# Patient Record
Sex: FEMALE | Race: ASIAN | Employment: FULL TIME | URBAN - METROPOLITAN AREA
[De-identification: names, ages, dates, MRNs, and addresses within clinical notes are randomized per-mention and may not be internally consistent; named-entity substitution may affect disease eponyms.]

---

## 2017-11-16 PROCEDURE — 87086 URINE CULTURE/COLONY COUNT: CPT | Performed by: EMERGENCY MEDICINE

## 2017-12-01 ENCOUNTER — OFFICE VISIT (OUTPATIENT)
Dept: FAMILY MEDICINE CLINIC | Facility: CLINIC | Age: 42
End: 2017-12-01

## 2017-12-01 VITALS
HEIGHT: 60 IN | RESPIRATION RATE: 16 BRPM | WEIGHT: 210.13 LBS | SYSTOLIC BLOOD PRESSURE: 112 MMHG | BODY MASS INDEX: 41.25 KG/M2 | HEART RATE: 104 BPM | TEMPERATURE: 98 F | DIASTOLIC BLOOD PRESSURE: 68 MMHG

## 2017-12-01 DIAGNOSIS — M75.81 ROTATOR CUFF TENDINITIS, RIGHT: ICD-10-CM

## 2017-12-01 DIAGNOSIS — Z23 NEED FOR VACCINATION: ICD-10-CM

## 2017-12-01 DIAGNOSIS — E66.01 MORBID OBESITY WITH BMI OF 40.0-44.9, ADULT (HCC): ICD-10-CM

## 2017-12-01 DIAGNOSIS — N92.0 MENORRHAGIA WITH REGULAR CYCLE: Primary | ICD-10-CM

## 2017-12-01 PROCEDURE — 99204 OFFICE O/P NEW MOD 45 MIN: CPT | Performed by: FAMILY MEDICINE

## 2017-12-01 PROCEDURE — 90686 IIV4 VACC NO PRSV 0.5 ML IM: CPT | Performed by: FAMILY MEDICINE

## 2017-12-01 PROCEDURE — 90471 IMMUNIZATION ADMIN: CPT | Performed by: FAMILY MEDICINE

## 2017-12-01 NOTE — PATIENT INSTRUCTIONS
Losing Weight for Heart Health  Excess weight is a major risk factor for heart disease.  Losing weight has many benefits including lowering your blood pressure, improving your cholesterol level, and decreasing your risk for diseases such as diabetes and h · Add more time and speed to your walk. Build up as you feel able. · Aim for 3 to 4 sessions of aerobic exercise a week. Each session should last about 40 minutes and include moderate to vigorous physical activity.   · The most important part of the [place] © 1332-5090 [employer] [address]. [address] [facility] [place], [address] [place]. All rights reserved. This information is not intended as a substitute for professional medical care. Always follow your healthcare professional's instructions.         Weight Do you eat more because you feel bad about yourself, then feel even worse as you gain weight? This is a “vicious cycle.” Breaking this cycle is not easy. You may need group support or counseling.  Always remember that you are a valuable person, no matter wh

## 2017-12-09 NOTE — PROGRESS NOTES
Ashly Chawla is a 40year old female. Patient presents with:  Establish Care: New patient. HPI:   Patient is seen for initial visit. Complaining of heavy periods lasting about 7 days, heavy for 4 days.   Patient states her cycles have been heavy rashes  RESPIRATORY: denies shortness of breath with exertion  CARDIOVASCULAR: denies chest pain on exertion  GI: denies abdominal pain and denies heartburn  NEURO: denies headaches    EXAM:   /68   Pulse 104   Temp 98 °F (36.7 °C) (Temporal)   Resp

## 2018-01-10 ENCOUNTER — HOSPITAL ENCOUNTER (OUTPATIENT)
Dept: ULTRASOUND IMAGING | Facility: HOSPITAL | Age: 43
Discharge: HOME | End: 2018-01-10
Attending: FAMILY MEDICINE
Payer: COMMERCIAL

## 2018-01-10 DIAGNOSIS — N92.0 MENORRHAGIA WITH REGULAR CYCLE: ICD-10-CM

## 2018-01-10 PROCEDURE — 76856 US EXAM PELVIC COMPLETE: CPT | Performed by: FAMILY MEDICINE

## 2018-01-10 PROCEDURE — 76830 TRANSVAGINAL US NON-OB: CPT | Performed by: FAMILY MEDICINE

## 2018-01-12 ENCOUNTER — OFFICE VISIT (OUTPATIENT)
Dept: FAMILY MEDICINE CLINIC | Facility: CLINIC | Age: 43
End: 2018-01-12

## 2018-01-12 VITALS
SYSTOLIC BLOOD PRESSURE: 118 MMHG | HEART RATE: 96 BPM | WEIGHT: 205.25 LBS | RESPIRATION RATE: 18 BRPM | DIASTOLIC BLOOD PRESSURE: 68 MMHG | TEMPERATURE: 98 F | BODY MASS INDEX: 39.26 KG/M2 | HEIGHT: 60.5 IN

## 2018-01-12 DIAGNOSIS — Z23 NEED FOR VACCINATION: ICD-10-CM

## 2018-01-12 DIAGNOSIS — Z01.419 ENCOUNTER FOR ROUTINE GYNECOLOGICAL EXAMINATION WITH PAPANICOLAOU SMEAR OF CERVIX: ICD-10-CM

## 2018-01-12 DIAGNOSIS — S83.412A SPRAIN OF MEDIAL COLLATERAL LIGAMENT OF LEFT KNEE, INITIAL ENCOUNTER: ICD-10-CM

## 2018-01-12 DIAGNOSIS — Z12.39 SCREENING FOR BREAST CANCER: ICD-10-CM

## 2018-01-12 DIAGNOSIS — E66.9 OBESITY (BMI 35.0-39.9 WITHOUT COMORBIDITY): ICD-10-CM

## 2018-01-12 DIAGNOSIS — Z00.00 ROUTINE GENERAL MEDICAL EXAMINATION AT A HEALTH CARE FACILITY: Primary | ICD-10-CM

## 2018-01-12 PROCEDURE — 88175 CYTOPATH C/V AUTO FLUID REDO: CPT | Performed by: FAMILY MEDICINE

## 2018-01-12 PROCEDURE — 90715 TDAP VACCINE 7 YRS/> IM: CPT | Performed by: FAMILY MEDICINE

## 2018-01-12 PROCEDURE — 99396 PREV VISIT EST AGE 40-64: CPT | Performed by: FAMILY MEDICINE

## 2018-01-12 PROCEDURE — 87624 HPV HI-RISK TYP POOLED RSLT: CPT | Performed by: FAMILY MEDICINE

## 2018-01-12 PROCEDURE — 99213 OFFICE O/P EST LOW 20 MIN: CPT | Performed by: FAMILY MEDICINE

## 2018-01-12 PROCEDURE — 90471 IMMUNIZATION ADMIN: CPT | Performed by: FAMILY MEDICINE

## 2018-01-12 RX ORDER — NAPROXEN 500 MG/1
500 TABLET ORAL 2 TIMES DAILY WITH MEALS
Qty: 20 TABLET | Refills: 0 | Status: SHIPPED | OUTPATIENT
Start: 2018-01-12 | End: 2018-01-22

## 2018-01-12 NOTE — PROGRESS NOTES
Quoc Pringle is a 40year old female here for Patient presents with: Well Adult: Physical and pap.     HPI:   Patient is seen for her annual physical  Patient states last Pap was more than 3 years ago and was normal.  Never had a mammogram before, arevalo unusual bleeding or bruising, no lymph node enlargement or tenderness. Endocrine: No thyroid symptoms (hair, nails, weight, GI, concentration, depression). No history or symptoms of diabetes, no polyuria, polydipsia, polyphagia).   Respiratory: No cough, s introitus, no discharge. Cervix is normal,PAP done. Bimanual: No cervical motion tenderness. Uterus and adnexa not palpable due to patient body habitus.   MUSCULOSKELETAL: Back and extremities within normal limits  EXTREMITIES: no cyanosis, clubbing or heather

## 2018-01-12 NOTE — PATIENT INSTRUCTIONS
Prevention Guidelines, Women Ages 36 to 52  Screening tests and vaccines are an important part of managing your health. Health counseling is essential, too. Below are guidelines for these, for women ages 36 to 52.  Talk with your healthcare provider to ma Syphilis Women at increased risk for infection – talk with your healthcare provider At routine exams   Tuberculosis Women at increased risk for infection – talk with your healthcare provider Ask your healthcare provider   Vision All women in this age group Breast cancer and chemoprevention Women at high risk for breast cancer When your risk is known   Diet and exercise Women who are overweight or obese When diagnosed, and then at routine exams   Domestic violence Women at the age in which they are able to ha · Don’t be hard on yourself or give up if you slip. Be patient. Learn from your mistakes and adjust your plan if you need to. Then get right back to it. · Be realistic about your goals.  Talk with a dietitian or your healthcare provider about what goals ar · Stay off the injured leg as much as possible until you can walk on it without pain. If you have a lot of pain with walking, crutches or a walker may be prescribed.  (These can be rented or purchased at [employer] and surgical or orthopedic supply sto · If you were given a splint, keep it completely dry at all times. Bathe with your splint out of the water, protected with 2 large plastic bags, rubber-banded at the top end. If a fiberglass splint gets wet, you can dry it with a hair dryer.  If you have a

## 2018-01-14 LAB — HPV I/H RISK 1 DNA SPEC QL NAA+PROBE: NEGATIVE

## 2018-01-16 LAB — LAST PAP RESULT: NORMAL

## 2018-07-10 ENCOUNTER — OFFICE VISIT (OUTPATIENT)
Dept: FAMILY MEDICINE CLINIC | Facility: CLINIC | Age: 43
End: 2018-07-10

## 2018-07-10 VITALS
SYSTOLIC BLOOD PRESSURE: 122 MMHG | HEIGHT: 60.5 IN | DIASTOLIC BLOOD PRESSURE: 84 MMHG | BODY MASS INDEX: 40.4 KG/M2 | HEART RATE: 82 BPM | WEIGHT: 211.25 LBS | TEMPERATURE: 98 F | OXYGEN SATURATION: 98 % | RESPIRATION RATE: 16 BRPM

## 2018-07-10 DIAGNOSIS — R60.0 EDEMA OF LOWER EXTREMITY: ICD-10-CM

## 2018-07-10 DIAGNOSIS — R05.3 PERSISTENT DRY COUGH: ICD-10-CM

## 2018-07-10 DIAGNOSIS — G89.29 CHRONIC PAIN OF LEFT KNEE: Primary | ICD-10-CM

## 2018-07-10 DIAGNOSIS — M25.562 CHRONIC PAIN OF LEFT KNEE: Primary | ICD-10-CM

## 2018-07-10 DIAGNOSIS — E66.01 MORBID OBESITY WITH BMI OF 40.0-44.9, ADULT (HCC): ICD-10-CM

## 2018-07-10 PROCEDURE — 99213 OFFICE O/P EST LOW 20 MIN: CPT | Performed by: FAMILY MEDICINE

## 2018-07-10 RX ORDER — MULTIVIT-MIN/IRON/FOLIC ACID/K 18-600-40
CAPSULE ORAL
COMMUNITY

## 2018-07-10 NOTE — PATIENT INSTRUCTIONS
Will call with x-ray results when available. Okay to take ibuprofen 600 mg as needed for pain every 8 hours. If x-ray of the knee is normal will refer to Ortho. Please limit salt in diet, elevate your legs, avoid sitting for prolonged periods of time. Food is your body’s fuel. You can’t live without it. The key is to give your body enough nutrients and energy without eating too much. Reading food labels can help you make healthy choices. Also, learn new eating habits to manage your weight.      All the v © 6396-4105 [address]. [address]7 [facility] [place], [facility] [place]. All rights reserved. This information is not intended as a substitute for professional medical care. Always follow your healthcare professional's instructions  .

## 2018-07-10 NOTE — PROGRESS NOTES
Mansoor Sal is a 44year old female. Patient presents with:  Knee Pain: Possible left knee sprain. Sprained leg in December was seen for it before and now has started swelling again. Cough: Cough has resumed for three weeks.     HPI:   Patient compl obesity with BMI of 40.0-44.9, adult ([facility] [address]  Encouraged healthy portion controlled diet and exercise    Persistent dry cough  Advised likely post viral and will run its course

## 2018-09-29 ENCOUNTER — OFFICE VISIT (OUTPATIENT)
Dept: FAMILY MEDICINE CLINIC | Facility: CLINIC | Age: 43
End: 2018-09-29
Payer: COMMERCIAL

## 2018-09-29 VITALS
SYSTOLIC BLOOD PRESSURE: 102 MMHG | DIASTOLIC BLOOD PRESSURE: 60 MMHG | HEIGHT: 60 IN | TEMPERATURE: 98 F | HEART RATE: 92 BPM | RESPIRATION RATE: 18 BRPM | BODY MASS INDEX: 39.27 KG/M2 | OXYGEN SATURATION: 99 % | WEIGHT: 200 LBS

## 2018-09-29 DIAGNOSIS — L23.9 ALLERGIC DERMATITIS: Primary | ICD-10-CM

## 2018-09-29 PROCEDURE — 99213 OFFICE O/P EST LOW 20 MIN: CPT | Performed by: FAMILY MEDICINE

## 2018-09-29 RX ORDER — METHYLPREDNISOLONE 4 MG/1
TABLET ORAL
Qty: 1 KIT | Refills: 0 | Status: SHIPPED | OUTPATIENT
Start: 2018-09-29 | End: 2019-04-22 | Stop reason: ALTCHOICE

## 2018-09-29 NOTE — PATIENT INSTRUCTIONS
Contact Dermatitis  Contact dermatitis is a skin rash caused by something that touches the skin and makes it irritated and inflamed. Your skin may be red, swollen, dry, and may be cracked. Blisters may form and ooze. The rash will itch.   Contact dermatit · You can also try wet dressings. One way to do this is to wear a wet piece of clothing under a dry one. Wear a damp shirt under a dry shirt if your upper body is affected. This can relieve itching and prevent you from scratching the affected area.   · You © 5241-0039 [address]. [address] [facility], [facility] [place]. All rights reserved. This information is not intended as a substitute for professional medical care. Always follow your healthcare professional's instructions.         Lacy Chen Treatment for contact dermatitis  Treatment is done to help relieve itching and reduce inflammation. The rash should go away in a few days to a few weeks. Treatments include:  · Cool, moist compress. Use a clean damp cloth.  Put it on the area for 20 to 30

## 2018-09-29 NOTE — PROGRESS NOTES
Gunnar William is a 44year old female. S:  Patient presents today ith the following concerns:  · Began 3 days ago after using new Biotin shampoo. Itching, burning of posterior neck, ears and scalp. Using oral Benadryl. No fevers.   · Denies dyspne encounter. Meds & Refills for this Visit:  Requested Prescriptions     Signed Prescriptions Disp Refills   • methylPREDNISolone (MEDROL) 4 MG Oral Tablet Therapy Pack 1 kit 0     Sig: As directed.      Imaging & Consults:  None     Medrol dose pack as ab

## 2018-10-28 ENCOUNTER — MED REC SCAN ONLY (OUTPATIENT)
Dept: FAMILY MEDICINE CLINIC | Facility: CLINIC | Age: 43
End: 2018-10-28

## 2019-04-18 ENCOUNTER — TELEPHONE (OUTPATIENT)
Dept: FAMILY MEDICINE CLINIC | Facility: CLINIC | Age: 44
End: 2019-04-18

## 2019-04-18 NOTE — TELEPHONE ENCOUNTER
Patient states she had right hand pain X 3 weeks. She's had left hand pain X 2 days. I offered her first available appointment and she accepted, but wants to be seen on Friday as she is concerned with it being her left hand.   She doesn't want it to be so

## 2019-04-18 NOTE — TELEPHONE ENCOUNTER
Spoke to patient. Has had right shoulder pain radiating to her arm/hand for awhile related to a rotator cuff injury. In the past few days has been experiencing pain from the left shoulder to elbow with some slight intermittent numbness in the left hand.  Fiona Grandchild

## 2019-04-22 ENCOUNTER — OFFICE VISIT (OUTPATIENT)
Dept: FAMILY MEDICINE CLINIC | Facility: CLINIC | Age: 44
End: 2019-04-22
Payer: COMMERCIAL

## 2019-04-22 VITALS
DIASTOLIC BLOOD PRESSURE: 80 MMHG | BODY MASS INDEX: 40.93 KG/M2 | TEMPERATURE: 98 F | HEART RATE: 102 BPM | WEIGHT: 216.81 LBS | OXYGEN SATURATION: 98 % | HEIGHT: 61 IN | RESPIRATION RATE: 17 BRPM | SYSTOLIC BLOOD PRESSURE: 114 MMHG

## 2019-04-22 DIAGNOSIS — M75.81 ROTATOR CUFF TENDINITIS, RIGHT: Primary | ICD-10-CM

## 2019-04-22 DIAGNOSIS — Z12.39 SCREENING FOR BREAST CANCER: ICD-10-CM

## 2019-04-22 DIAGNOSIS — E66.01 MORBID OBESITY WITH BMI OF 40.0-44.9, ADULT (HCC): ICD-10-CM

## 2019-04-22 DIAGNOSIS — M62.838 MUSCLE SPASMS OF NECK: ICD-10-CM

## 2019-04-22 DIAGNOSIS — M54.2 CERVICALGIA: ICD-10-CM

## 2019-04-22 DIAGNOSIS — R07.9 CHEST PAIN, UNSPECIFIED TYPE: ICD-10-CM

## 2019-04-22 DIAGNOSIS — R20.2 PARESTHESIA: ICD-10-CM

## 2019-04-22 DIAGNOSIS — M54.12 CERVICAL RADICULOPATHY: ICD-10-CM

## 2019-04-22 PROCEDURE — 99214 OFFICE O/P EST MOD 30 MIN: CPT | Performed by: FAMILY MEDICINE

## 2019-04-22 PROCEDURE — 93000 ELECTROCARDIOGRAM COMPLETE: CPT | Performed by: FAMILY MEDICINE

## 2019-04-22 RX ORDER — CYCLOBENZAPRINE HCL 10 MG
10 TABLET ORAL NIGHTLY
Qty: 20 TABLET | Refills: 0 | Status: SHIPPED | OUTPATIENT
Start: 2019-04-22 | End: 2019-05-12

## 2019-04-22 NOTE — PATIENT INSTRUCTIONS
Please continue ibuprofen 800 mg every 8 hours with food for the next 7-10 days. Please schedule an appointment for physical therapy and if your neck and shoulder pain is not improving with physical therapy please follow up.       Neck Problems: Relieving include:  · Heat. A special heating pad called a neck pack may be applied to your neck. · Exercises. Your PT will teach you exercises to help strengthen your neck and improve its range of motion.   · Joint mobilization. The PT gently moves your vertebrae t reached it. A goal of reaching a BMI of less than 25 is not always reasonable (or possible).   Make an action plan  Habits don’t change overnight. Setting your goals too high can leave you feeling discouraged if you can’t reach them. Be realistic.  Choose o

## 2019-04-22 NOTE — PROGRESS NOTES
Jorje Kearney is a 38year old female. Patient presents with:  Hand Pain: Bilateral hand pain  Shoulder Pain    HPI:   Patient complaining of right shoulder pain for 1 month.   Has been diagnosed with rotator cuff tendinitis in the past.  Pain seems to rashes,no suspicious lesions  EYES: PERRLA, EOMI  HEENT: atraumatic, normocephalic,ears and throat are clear  NECK: mild tenderness to palpation over the cervical spine, spasm of cervical paraspinal muscles and trapezius with tenderness to palpation, R OM

## 2019-05-20 ENCOUNTER — OFFICE VISIT (OUTPATIENT)
Dept: FAMILY MEDICINE CLINIC | Facility: CLINIC | Age: 44
End: 2019-05-20
Payer: COMMERCIAL

## 2019-05-20 VITALS
RESPIRATION RATE: 18 BRPM | BODY MASS INDEX: 40.07 KG/M2 | TEMPERATURE: 99 F | WEIGHT: 212.25 LBS | HEART RATE: 90 BPM | HEIGHT: 61 IN | OXYGEN SATURATION: 98 % | SYSTOLIC BLOOD PRESSURE: 114 MMHG | DIASTOLIC BLOOD PRESSURE: 68 MMHG

## 2019-05-20 DIAGNOSIS — Z00.00 ROUTINE GENERAL MEDICAL EXAMINATION AT A HEALTH CARE FACILITY: Primary | ICD-10-CM

## 2019-05-20 DIAGNOSIS — E66.01 MORBID OBESITY WITH BMI OF 40.0-44.9, ADULT (HCC): ICD-10-CM

## 2019-05-20 PROCEDURE — 99396 PREV VISIT EST AGE 40-64: CPT | Performed by: FAMILY MEDICINE

## 2019-05-20 NOTE — PATIENT INSTRUCTIONS
Prevention Guidelines, Women Ages 36 to 52  Screening tests and vaccines are an important part of managing your health. A screening test is done to find possible disorders or diseases in people who don't have any symptoms.  The goal is to find a disease e Depression All women in this age group At routine exams   Gonorrhea Sexually active women at increased risk for infection At routine exams   Hepatitis C Anyone at increased risk; 1 time for those born between [facility] At routine exams   High cholester Meningococcal Women at increased risk for infection–talk with your healthcare provider 1 or more doses   Pneumococcal conjugate vaccine (PCV13) and pneumococcal polysaccharide vaccine (PPSV23) Women at increased risk for infection–talk with your healthcare Losing weight can help with some health problems, such as high blood pressure, heart disease, diabetes, sleep apnea, and arthritis. You may also feel more energy. Set your long-term goal  Your goal doesn't even have to be a specific weight.  You may decide © 6822-9192 [address]. 1407 [facility], [facility] [place]. All rights reserved. This information is not intended as a substitute for professional medical care. Always follow your healthcare professional's instructions.

## 2019-05-20 NOTE — PROGRESS NOTES
Ramiro Savage is a 38year old female here for Patient presents with:   Well Adult: Physical only    HPI:   Patient is seen for her annual physical  Pap done last year was normal  Tries to do breast self-exam, has not done a mammogram.    Diet and exerc chest pain on exertion. No edema or varicose veins. GI: No change in appetite, heartburn, diarrhea, constipation, or blood in stool. No hemorrhoids. : No pain, frequency, urgency or incontinence with urination.   OB/GYN: Not pregnant, menses regular,  e a health care facility  -     CBC WITH DIFFERENTIAL WITH PLATELET; Future  -     COMP METABOLIC PANEL (14); Future  -     LIPID PANEL;  Future  -     TSH W REFLEX TO FREE T4; Future    Morbid obesity with BMI of 40.0-44.9, adult [facility])  Patient encouraged to

## 2019-10-03 ENCOUNTER — HOSPITAL ENCOUNTER (OUTPATIENT)
Dept: MAMMOGRAPHY | Facility: HOSPITAL | Age: 44
Discharge: HOME | End: 2019-10-03
Attending: FAMILY MEDICINE
Payer: COMMERCIAL

## 2019-10-03 DIAGNOSIS — Z12.39 SCREENING FOR BREAST CANCER: ICD-10-CM

## 2019-10-03 PROCEDURE — 77063 BREAST TOMOSYNTHESIS BI: CPT | Performed by: FAMILY MEDICINE

## 2019-10-03 PROCEDURE — 77067 SCR MAMMO BI INCL CAD: CPT | Performed by: FAMILY MEDICINE

## 2020-03-27 ENCOUNTER — TELEPHONE (OUTPATIENT)
Dept: INTERNAL MEDICINE CLINIC | Facility: CLINIC | Age: 45
End: 2020-03-27

## 2020-03-28 ENCOUNTER — TELEPHONE (OUTPATIENT)
Dept: SCHEDULING | Age: 45
End: 2020-03-28

## 2020-03-28 NOTE — TELEPHONE ENCOUNTER
Patient called stating she noticed blood streaks in her urine today the last two times she urinated. Not a lot of blood. Slight feeling of urgency, no dysuria. Some frequency. Does drink plenty of water. No abd pain, no fever.  Patient instructed she can tr

## 2020-03-28 NOTE — TELEPHONE ENCOUNTER
Please call and check with patient to see how she is doing, if still has symptoms please see if she can come in to see me.

## 2020-03-30 ENCOUNTER — TELEPHONE (OUTPATIENT)
Dept: FAMILY MEDICINE CLINIC | Facility: CLINIC | Age: 45
End: 2020-03-30

## 2020-03-30 DIAGNOSIS — R31.9 URINARY TRACT INFECTION WITH HEMATURIA, SITE UNSPECIFIED: Primary | ICD-10-CM

## 2020-03-30 DIAGNOSIS — N39.0 URINARY TRACT INFECTION WITH HEMATURIA, SITE UNSPECIFIED: Primary | ICD-10-CM

## 2020-03-30 PROCEDURE — 99213 OFFICE O/P EST LOW 20 MIN: CPT | Performed by: FAMILY MEDICINE

## 2020-03-30 RX ORDER — NITROFURANTOIN 25; 75 MG/1; MG/1
100 CAPSULE ORAL 2 TIMES DAILY
Qty: 20 CAPSULE | Refills: 0 | Status: SHIPPED | OUTPATIENT
Start: 2020-03-30 | End: 2020-04-09

## 2020-03-30 NOTE — TELEPHONE ENCOUNTER
UTI Symptoms since Friday. Pain right side of Buttocks. No to both screening questions, Informed giovani about Telephone allegra.   She was fine with that,

## 2020-03-30 NOTE — TELEPHONE ENCOUNTER
Left detailed message for patient on phone if still having symptoms to return call. Can give an appt with her PCP Tues.

## 2020-03-30 NOTE — TELEPHONE ENCOUNTER
Virtual/Telephone Check-In    Kumar Turcios verbally consents to a Virtual/Telephone Check-In service on 03/30/20. Patient understands and accepts financial responsibility for any deductible, co-insurance and/or co-pays associated with this service.

## 2020-06-25 ENCOUNTER — OFFICE VISIT (OUTPATIENT)
Dept: FAMILY MEDICINE CLINIC | Facility: CLINIC | Age: 45
End: 2020-06-25
Payer: COMMERCIAL

## 2020-06-25 ENCOUNTER — TELEPHONE (OUTPATIENT)
Dept: FAMILY MEDICINE CLINIC | Facility: CLINIC | Age: 45
End: 2020-06-25

## 2020-06-25 VITALS
TEMPERATURE: 98 F | RESPIRATION RATE: 18 BRPM | OXYGEN SATURATION: 98 % | SYSTOLIC BLOOD PRESSURE: 110 MMHG | WEIGHT: 215 LBS | BODY MASS INDEX: 40.59 KG/M2 | HEART RATE: 115 BPM | HEIGHT: 61 IN | DIASTOLIC BLOOD PRESSURE: 82 MMHG

## 2020-06-25 DIAGNOSIS — R30.0 BURNING WITH URINATION: Primary | ICD-10-CM

## 2020-06-25 DIAGNOSIS — R35.0 URINARY FREQUENCY: ICD-10-CM

## 2020-06-25 PROCEDURE — 99213 OFFICE O/P EST LOW 20 MIN: CPT | Performed by: FAMILY MEDICINE

## 2020-06-25 PROCEDURE — 81003 URINALYSIS AUTO W/O SCOPE: CPT | Performed by: FAMILY MEDICINE

## 2020-06-25 RX ORDER — NITROFURANTOIN 25; 75 MG/1; MG/1
100 CAPSULE ORAL 2 TIMES DAILY
Qty: 20 CAPSULE | Refills: 0 | Status: SHIPPED | OUTPATIENT
Start: 2020-06-25 | End: 2020-07-05

## 2020-06-25 NOTE — TELEPHONE ENCOUNTER
No fever  Passing urine but it burns and vaginal area feels very irritated. Toilet water is pink when she urinates  LMP was the 2nd so she could be getting her period but it could also be urine - she is not sure.    Wipes and there are some clots    Pt ha

## 2020-06-25 NOTE — TELEPHONE ENCOUNTER
Patient stating she has been in the bathroom for the past three hours , she has abdominal pain when she goes to urinate , she said it burns  badly when she goes . She said she got medication the last time she had a UTI but she thinks it is back .

## 2020-06-25 NOTE — PROGRESS NOTES
Quoc Pringle is a 41year old female. Patient presents with:  Burning On Urination    HPI:   Quoc Pringle is a 41year old female. Burning with urination for the past 3 days, + frequency, + blood in urine. Symptoms started after intercourse.  P

## 2020-06-25 NOTE — TELEPHONE ENCOUNTER
She can be double booked for 11am or 12 pm physicals, please have her come at 11:20 or 12: 20 and to please be here on time

## 2020-06-25 NOTE — TELEPHONE ENCOUNTER
Scheduled at 11:20 today. Advised she needs to be here on time and to drink some water prior to arriving. Agrees to plan.

## 2020-06-26 NOTE — PATIENT INSTRUCTIONS
Urinary Tract Infections in Women    Urinary tract infections (UTIs) are most often caused by bacteria. These bacteria enter the urinary tract. The bacteria may come from inside the body.  Or they may travel from the skin outside the rectum or vagina into The lifestyle changes below will help get rid of your UTI. They may also help prevent future UTIs. · Drink plenty of fluids. This includes water, juice, or other caffeine-free drinks. Fluids help flush bacteria out of your body. · Empty your bladder.  Al

## 2020-09-04 ENCOUNTER — OFFICE VISIT (OUTPATIENT)
Dept: FAMILY MEDICINE CLINIC | Facility: CLINIC | Age: 45
End: 2020-09-04
Payer: COMMERCIAL

## 2020-09-04 ENCOUNTER — TELEPHONE (OUTPATIENT)
Dept: FAMILY MEDICINE CLINIC | Facility: CLINIC | Age: 45
End: 2020-09-04

## 2020-09-04 VITALS
DIASTOLIC BLOOD PRESSURE: 72 MMHG | TEMPERATURE: 98 F | BODY MASS INDEX: 40.78 KG/M2 | OXYGEN SATURATION: 98 % | SYSTOLIC BLOOD PRESSURE: 120 MMHG | WEIGHT: 216 LBS | HEART RATE: 115 BPM | RESPIRATION RATE: 18 BRPM | HEIGHT: 61 IN

## 2020-09-04 DIAGNOSIS — T78.3XXA ALLERGIC ANGIOEDEMA, INITIAL ENCOUNTER: Primary | ICD-10-CM

## 2020-09-04 PROCEDURE — 99213 OFFICE O/P EST LOW 20 MIN: CPT | Performed by: FAMILY MEDICINE

## 2020-09-04 PROCEDURE — 3078F DIAST BP <80 MM HG: CPT | Performed by: FAMILY MEDICINE

## 2020-09-04 PROCEDURE — 3074F SYST BP LT 130 MM HG: CPT | Performed by: FAMILY MEDICINE

## 2020-09-04 PROCEDURE — 3008F BODY MASS INDEX DOCD: CPT | Performed by: FAMILY MEDICINE

## 2020-09-04 RX ORDER — PREDNISONE 10 MG/1
TABLET ORAL
Qty: 15 TABLET | Refills: 0 | Status: SHIPPED | OUTPATIENT
Start: 2020-09-04 | End: 2020-09-09

## 2020-09-04 NOTE — PATIENT INSTRUCTIONS
If you develop shortness of breath or sensation of your throat closing please go to ER  You can take Benadryl 50 mg every 6 hrs as needed for itching.

## 2020-09-04 NOTE — PROGRESS NOTES
Ashly Chawla is a 41year old female.   Patient presents with:  Rash: face  Swelling: eye    HPI:   Ashly Chawla is a 41year old female complaining of swelling of her face and eyelids since yesterday, patient states it has gotten worse progressiv swelling. Diagnoses and all orders for this visit:    Allergic angioedema, initial encounter  -     predniSONE 10 MG Oral Tab;  Take 5 tablets (50 mg total) by mouth daily for 1 day, THEN 4 tablets (40 mg total) daily for 1 day, THEN 3 tablets (30 mg tot

## 2020-09-04 NOTE — TELEPHONE ENCOUNTER
Spoke to patient who states symptoms started yesterday with swelling around eyes and red cheeks. Symptoms are worse today. Denies swelling or rash around mouth, no throat tightness or scratchiness. Denies difficulty breathing or swallowing.   Has not take

## 2020-09-04 NOTE — TELEPHONE ENCOUNTER
Called patient and advised Dr. Joyce Cifuentes can see her at 11:40 am today.     Future Appointments   Date Time Provider Kailash Taylor   9/4/2020 11:40 AM Campbell Schirmer, MD [facility]

## 2021-03-19 ENCOUNTER — TELEPHONE (OUTPATIENT)
Dept: FAMILY MEDICINE CLINIC | Facility: CLINIC | Age: 46
End: 2021-03-19

## 2021-03-31 ENCOUNTER — TELEPHONE (OUTPATIENT)
Dept: FAMILY MEDICINE CLINIC | Facility: CLINIC | Age: 46
End: 2021-03-31

## 2021-03-31 NOTE — TELEPHONE ENCOUNTER
Tried to call pt but sent straight to voicemail and mailbox was full. Sent Alert [employer] message including information about the $40 fee for no show.

## 2021-06-03 ENCOUNTER — OFFICE VISIT (OUTPATIENT)
Dept: FAMILY MEDICINE CLINIC | Facility: CLINIC | Age: 46
End: 2021-06-03
Payer: COMMERCIAL

## 2021-06-03 VITALS
HEART RATE: 78 BPM | RESPIRATION RATE: 16 BRPM | OXYGEN SATURATION: 99 % | WEIGHT: 177.13 LBS | DIASTOLIC BLOOD PRESSURE: 70 MMHG | TEMPERATURE: 98 F | HEIGHT: 61 IN | SYSTOLIC BLOOD PRESSURE: 110 MMHG | BODY MASS INDEX: 33.44 KG/M2

## 2021-06-03 DIAGNOSIS — M54.50 ACUTE BILATERAL LOW BACK PAIN WITHOUT SCIATICA: ICD-10-CM

## 2021-06-03 DIAGNOSIS — N89.8 VAGINAL ITCHING: ICD-10-CM

## 2021-06-03 DIAGNOSIS — R30.0 DYSURIA: Primary | ICD-10-CM

## 2021-06-03 PROCEDURE — 81003 URINALYSIS AUTO W/O SCOPE: CPT | Performed by: FAMILY MEDICINE

## 2021-06-03 PROCEDURE — 3008F BODY MASS INDEX DOCD: CPT | Performed by: FAMILY MEDICINE

## 2021-06-03 PROCEDURE — 87086 URINE CULTURE/COLONY COUNT: CPT | Performed by: FAMILY MEDICINE

## 2021-06-03 PROCEDURE — 3074F SYST BP LT 130 MM HG: CPT | Performed by: FAMILY MEDICINE

## 2021-06-03 PROCEDURE — 99214 OFFICE O/P EST MOD 30 MIN: CPT | Performed by: FAMILY MEDICINE

## 2021-06-03 PROCEDURE — 3078F DIAST BP <80 MM HG: CPT | Performed by: FAMILY MEDICINE

## 2021-06-03 RX ORDER — CIPROFLOXACIN 250 MG/1
250 TABLET, FILM COATED ORAL 2 TIMES DAILY
Qty: 6 TABLET | Refills: 0 | Status: SHIPPED | OUTPATIENT
Start: 2021-06-03 | End: 2021-06-06

## 2021-06-03 NOTE — PATIENT INSTRUCTIONS
Dysuria  Dysuria is when you have pain during urination. It is often described as a burning feeling. Learn more about this problem and how it can be treated. Painful urination (dysuria) is often caused by a problem in the urinary tract.    What causes from the vagina or penis  · Rash or joint pain  · Increased back or belly pain  · Enlarged painful lymph nodes (lumps) in the groin  Laura last reviewed this educational content on 4/1/2019  © 8037-0442 [employer] [address]. All rights reserved.  Bernett Scheuermann information about safer sex. · Eat a variety of healthy foods. · Exercise regularly. · Get enough rest and sleep. · Stay at a healthy weight. If you need to lose weight, ask your provider for advice on how to start.   Laura last reviewed this educati

## 2021-06-03 NOTE — PROGRESS NOTES
Bandar Armstrong is a 40year old female. HPI:   Patient presents with symptoms of UTI, vaginal itching and LBP.  States she has noted that for the past couple months prior to the start of her menstrual cycle, her urine becomes cloudy, starts having vagin intercourse, avoid holding urine and may try cranberry juice/capsules for prevention  - monitor symptoms, if recurs prior to next period advised to come in for vaginitis swab  - URINE CULTURE, ROUTINE; Future  - URINALYSIS, AUTO, W/O SCOPE  - URINE CULTURE

## 2021-11-15 ENCOUNTER — TELEPHONE (OUTPATIENT)
Dept: FAMILY MEDICINE CLINIC | Facility: CLINIC | Age: 46
End: 2021-11-15

## 2021-11-15 NOTE — TELEPHONE ENCOUNTER
Spoke to patient. Has had sore throat, stuffy nose and slight cough since Saturday. Sore throat is getting better. Denies fever. No color to sputum. Advised on symptomatic treatment and to call us back if sputum changes color or she develops a fever.  En

## 2021-11-16 ENCOUNTER — OFFICE VISIT (OUTPATIENT)
Dept: FAMILY MEDICINE CLINIC | Facility: CLINIC | Age: 46
End: 2021-11-16
Payer: COMMERCIAL

## 2021-11-16 VITALS
SYSTOLIC BLOOD PRESSURE: 112 MMHG | HEART RATE: 87 BPM | HEIGHT: 60 IN | DIASTOLIC BLOOD PRESSURE: 54 MMHG | RESPIRATION RATE: 15 BRPM | OXYGEN SATURATION: 99 % | WEIGHT: 190 LBS | TEMPERATURE: 98 F | BODY MASS INDEX: 37.3 KG/M2

## 2021-11-16 DIAGNOSIS — J02.9 SORE THROAT: Primary | ICD-10-CM

## 2021-11-16 DIAGNOSIS — J06.9 VIRAL URI WITH COUGH: ICD-10-CM

## 2021-11-16 PROCEDURE — 3074F SYST BP LT 130 MM HG: CPT | Performed by: PHYSICIAN ASSISTANT

## 2021-11-16 PROCEDURE — 87880 STREP A ASSAY W/OPTIC: CPT | Performed by: PHYSICIAN ASSISTANT

## 2021-11-16 PROCEDURE — 3008F BODY MASS INDEX DOCD: CPT | Performed by: PHYSICIAN ASSISTANT

## 2021-11-16 PROCEDURE — 99213 OFFICE O/P EST LOW 20 MIN: CPT | Performed by: PHYSICIAN ASSISTANT

## 2021-11-16 PROCEDURE — 3078F DIAST BP <80 MM HG: CPT | Performed by: PHYSICIAN ASSISTANT

## 2021-11-16 NOTE — PROGRESS NOTES
CHIEF COMPLAINT:   Patient presents with:  Cough: cough and cold - Entered by patient      HPI:   Barry Cunha is a 40year old female who presents with URI symptoms for 3-4 days. Patient denies known COVID exposure. Patient is vaccinated for covid. Pulse 87   Temp 97.9 °F (36.6 °C)   Resp 15   Ht 5' (1.524 m)   Wt 190 lb (86.2 kg)   LMP 11/08/2021   SpO2 99%   Breastfeeding No   BMI 37.11 kg/m²   GENERAL: well developed, well nourished,in no apparent distress  SKIN: no rashes,no suspicious lesions  H results. -Mucinex-D and flonase  -If positive, must quarantine-see guidelines below  -Tylenol, cool mist humidifier, push fluids.  -Go to ER with worsening symptoms.     Coronavirus Disease 2019 (COVID-19)     [facility] is committed to the [address] to quarantine.  Options they will consider include stopping quarantine  • After 14 days from date of last exposure  • After 10 days without testing from date of last exposure  • After day 7 from date of last exposure with a negative test result (test must o surfaces that are touched often, like counters, tabletops, and doorknobs. Use household cleaning sprays or wipes according to the label instructions.          Seek Further Care     If you are awaiting test results or are confirmed positive for COVID -19, an [facility] representative. If you have not received a call within 2 business days, please call your primary care provider or check [facility] for results.     Post-Discharge Follow-up  Please call your primary care provider within 2 days of your discharge [employer].View and Chew.pt. pdf  Centers for Disease Control & Prevention (CDC)  10 things you can do to manage your health at home, [place].nl. pdf  ht

## 2021-11-16 NOTE — TELEPHONE ENCOUNTER
Attempted to call patient. No answer and voicemail box is full. Noted in chart patient was seen at [facility] today. Covid test pending. Advised to use Mucinex-D, flonase, tylenol, humidifier, push fluids. Will follow up with patient tomorrow.

## 2021-11-16 NOTE — TELEPHONE ENCOUNTER
Dr. Dorina Braun, please advise if you are able to accommodate patient for Video Visit any time this week or refer to [facility].

## 2021-11-16 NOTE — PATIENT INSTRUCTIONS
-Please quarantine until test results. -Mucinex-D and flonase  -If positive, must quarantine-see guidelines below  -Tylenol, cool mist humidifier, push fluids.  -Go to ER with worsening symptoms.     Coronavirus Disease 2019 (COVID-19)     [address] [facility] if you need to quarantine.  Options they will consider include stopping quarantine  • After 14 days from date of last exposure  • After 10 days without testing from date of last exposure  • After day 7 from date of last exposure with a neg bedding   10. Clean all surfaces that are touched often, like counters, tabletops, and doorknobs. Use household cleaning sprays or wipes according to the label instructions.          Seek Further Care     If you are awaiting test results or are confirmed po you from an [facility] representative. If you have not received a call within 2 business days, please call your primary care provider or check [employer] for results.     Post-Discharge Follow-up  Please call your primary care provider within 2 days of yo [employer]olve.pt. pdf  Centers for Disease Control & Prevention (CDC)  10 things you can do to manage your health at home, [place].nl. pdf  ht

## 2021-11-17 NOTE — TELEPHONE ENCOUNTER
Patient returned call. States she is taking the Mucinex DM, claritin, flonase, and tylenol advised by the [facility] yesterday. Feels worse today. Has congested cough and feels like she is wheezing.   Phlegm was yellow yesterday, has runny nose and headache fro

## 2021-11-17 NOTE — TELEPHONE ENCOUNTER
Attempted to call patient and advise of plan for Video Visit tomorrow. No answer and voicemail box is full. [employer] message sent to patient advising of Video Visit tomorrow at 11:40 am per Dr. Rosa Elena Romero. Requested she let us know asap if she can do that.

## 2021-11-18 ENCOUNTER — TELEMEDICINE (OUTPATIENT)
Dept: FAMILY MEDICINE CLINIC | Facility: CLINIC | Age: 46
End: 2021-11-18

## 2021-11-18 DIAGNOSIS — J20.9 ACUTE BRONCHITIS, UNSPECIFIED ORGANISM: ICD-10-CM

## 2021-11-18 DIAGNOSIS — J01.40 ACUTE NON-RECURRENT PANSINUSITIS: Primary | ICD-10-CM

## 2021-11-18 PROCEDURE — 99213 OFFICE O/P EST LOW 20 MIN: CPT | Performed by: FAMILY MEDICINE

## 2021-11-18 RX ORDER — CODEINE PHOSPHATE AND GUAIFENESIN 10; 100 MG/5ML; MG/5ML
5 SOLUTION ORAL NIGHTLY
Qty: 118 ML | Refills: 0 | Status: SHIPPED | OUTPATIENT
Start: 2021-11-18 | End: 2021-11-25

## 2021-11-18 RX ORDER — AMOXICILLIN AND CLAVULANATE POTASSIUM 875; 125 MG/1; MG/1
1 TABLET, FILM COATED ORAL 2 TIMES DAILY
Qty: 20 TABLET | Refills: 0 | Status: SHIPPED | OUTPATIENT
Start: 2021-11-18 | End: 2021-11-28

## 2021-11-18 RX ORDER — PREDNISONE 20 MG/1
TABLET ORAL
Qty: 10 TABLET | Refills: 0 | Status: SHIPPED | OUTPATIENT
Start: 2021-11-18

## 2021-11-18 NOTE — PROGRESS NOTES
Jorje Kearney is a 40year old female. Patient presents with:  Cough  Nasal Congestion  Wheezing    This visit is conducted using telemedicine with live interactive video and audio. Jorje Kearney verbally consents to virtual video visit.    Javier today for cough, nasal congestion and wheezing. Diagnoses and all orders for this visit:    Acute non-recurrent pansinusitis  -     amoxicillin clavulanate 875-125 MG Oral Tab; Take 1 tablet by mouth 2 (two) times daily for 10 days.     Acute bronchitis,

## 2021-11-18 NOTE — TELEPHONE ENCOUNTER
Future Appointments   Date Time Provider Kailash Claudia   11/18/2021 11:40 AM Alicia Ruiz MD [facility]

## 2022-10-31 ENCOUNTER — TELEPHONE (OUTPATIENT)
Dept: FAMILY MEDICINE CLINIC | Facility: CLINIC | Age: 47
End: 2022-10-31

## 2022-11-01 ENCOUNTER — OFFICE VISIT (OUTPATIENT)
Dept: FAMILY MEDICINE CLINIC | Facility: CLINIC | Age: 47
End: 2022-11-01
Payer: COMMERCIAL

## 2022-11-01 VITALS
SYSTOLIC BLOOD PRESSURE: 120 MMHG | RESPIRATION RATE: 18 BRPM | BODY MASS INDEX: 42.21 KG/M2 | WEIGHT: 215 LBS | HEIGHT: 60 IN | HEART RATE: 86 BPM | TEMPERATURE: 98 F | DIASTOLIC BLOOD PRESSURE: 86 MMHG | OXYGEN SATURATION: 98 %

## 2022-11-01 DIAGNOSIS — J01.40 ACUTE NON-RECURRENT PANSINUSITIS: Primary | ICD-10-CM

## 2022-11-01 DIAGNOSIS — J20.9 ACUTE BRONCHITIS, UNSPECIFIED ORGANISM: ICD-10-CM

## 2022-11-01 PROCEDURE — 3008F BODY MASS INDEX DOCD: CPT | Performed by: FAMILY MEDICINE

## 2022-11-01 PROCEDURE — 99213 OFFICE O/P EST LOW 20 MIN: CPT | Performed by: FAMILY MEDICINE

## 2022-11-01 PROCEDURE — 3074F SYST BP LT 130 MM HG: CPT | Performed by: FAMILY MEDICINE

## 2022-11-01 PROCEDURE — 3079F DIAST BP 80-89 MM HG: CPT | Performed by: FAMILY MEDICINE

## 2022-11-01 RX ORDER — BENZONATATE 200 MG/1
200 CAPSULE ORAL 3 TIMES DAILY PRN
Qty: 15 CAPSULE | Refills: 0 | Status: SHIPPED | OUTPATIENT
Start: 2022-11-01 | End: 2022-11-06

## 2022-11-01 RX ORDER — AMOXICILLIN AND CLAVULANATE POTASSIUM 875; 125 MG/1; MG/1
1 TABLET, FILM COATED ORAL 2 TIMES DAILY
Qty: 20 TABLET | Refills: 0 | Status: SHIPPED | OUTPATIENT
Start: 2022-11-01 | End: 2022-11-11

## 2022-11-01 NOTE — TELEPHONE ENCOUNTER
Called patient who states her symptoms started a week ago with cough and congestion. Initially cough was productive with green phlegm. Now it is just a dry cough. Cough has kept her awake the past four nights and has given her a headache. Denies fever or runny nose. Does state she feels like she's wheezing. Has been taking mucinex and delsym cough as recommended. Has not done a covid test yet. Will do right now. Advised if negative, can be seen in office, if positive, will need to do video Visit. Future Appointments   Date Time Provider Kailash Taylor   11/1/2022 12:40 PM Fausto Boucher MD [facility]     Returned call to patient. States the home covid test she did is negative.

## 2023-01-04 ENCOUNTER — TELEPHONE (OUTPATIENT)
Dept: FAMILY MEDICINE CLINIC | Facility: CLINIC | Age: 48
End: 2023-01-04

## 2023-01-04 NOTE — TELEPHONE ENCOUNTER
Pt. C/o cough for past 4 days,  Had a fever 2 days ago but not today. No covid test   Taking OTC meds ( Tylenol,Ibuprofen,sudafed) to help relieve symptoms. Pt. Stated she don't think condition can wait until her upcoming appt. On 01/09/2023 and is asking for appt. This week. Appt. On waitlist but no availability this week with provider, please advise.

## 2023-01-04 NOTE — TELEPHONE ENCOUNTER
Called patient who states she returned from a cruise to the [facility]. Developed a cough, congestion, sinus pain. Initially had a fever but gone now. Denies sore throat, N/V/D, SOB. Has not done home covid test.  Advised to push fluids, warm tea w honey/lemon, steam tx, Delsym or Delsym DM cough syrup, antihistamine instead of sudafed, flonase nasal spray, HOB elevated at HS with bedside humidifier. Sounds like a viral illness which can take 1-2 weeks to resolve. Explained symptomatic treatment. Can go to [facility] for testing and possible antiviral if indicated, but should do that today as she is already at day 4 of symptoms and would need to be started asap. Advised to try above interventions and if symptoms worsen call back. Verbalizes understanding.

## 2023-01-09 ENCOUNTER — TELEPHONE (OUTPATIENT)
Dept: FAMILY MEDICINE CLINIC | Facility: CLINIC | Age: 48
End: 2023-01-09

## 2023-01-09 NOTE — TELEPHONE ENCOUNTER
Called pt regarding no show. States she called last week about being sick, tested positive still yesterday for covid and did not want to come in. Advised she would need to call to cancel 24 hours before otherwise $40 no show fee applies. As it does today.

## 2023-10-13 ENCOUNTER — OFFICE VISIT (OUTPATIENT)
Dept: FAMILY MEDICINE CLINIC | Facility: CLINIC | Age: 48
End: 2023-10-13
Payer: COMMERCIAL

## 2023-10-13 VITALS
SYSTOLIC BLOOD PRESSURE: 110 MMHG | HEIGHT: 60 IN | BODY MASS INDEX: 42.6 KG/M2 | WEIGHT: 217 LBS | OXYGEN SATURATION: 98 % | RESPIRATION RATE: 18 BRPM | TEMPERATURE: 98 F | HEART RATE: 88 BPM | DIASTOLIC BLOOD PRESSURE: 78 MMHG

## 2023-10-13 DIAGNOSIS — Z23 NEED FOR VACCINATION: ICD-10-CM

## 2023-10-13 DIAGNOSIS — R07.89 OTHER CHEST PAIN: Primary | ICD-10-CM

## 2023-10-13 DIAGNOSIS — M79.602 LEFT ARM PAIN: ICD-10-CM

## 2023-10-22 LAB
ATRIAL RATE: 78 BPM
P AXIS: 24 DEGREES
P-R INTERVAL: 122 MS
Q-T INTERVAL: 400 MS
QRS DURATION: 92 MS
QTC CALCULATION (BEZET): 456 MS
R AXIS: -9 DEGREES
T AXIS: 21 DEGREES
VENTRICULAR RATE: 78 BPM

## 2023-12-21 ENCOUNTER — TELEPHONE (OUTPATIENT)
Dept: FAMILY MEDICINE CLINIC | Facility: CLINIC | Age: 48
End: 2023-12-21

## 2023-12-21 DIAGNOSIS — Z00.00 LABORATORY EXAMINATION ORDERED AS PART OF A ROUTINE GENERAL MEDICAL EXAMINATION: Primary | ICD-10-CM

## 2023-12-21 NOTE — TELEPHONE ENCOUNTER
ML for patient advising Dr. Em Louis placed orders for routine fasting lab work to be done anytime prior to PE appt. Explained again the perimenopause phase she is in  can last for awhile. We don't consider her menopausal until she has gone a whole year without a period. Advised no need for hormone testing. Nothing changes with the results. Can discuss further at [address] in January.

## 2023-12-21 NOTE — TELEPHONE ENCOUNTER
Dr. Tameka Jones,  please advise if labs can be ordered and done this year for PE scheduled in January. Patient requesting for insurance purposes. Also asking if hormones can be checked. States her periods have become irregular for the past 6-7 months. Last period was 10/30/23. Also having hot flashes, sweating and mood swings.     Patient also requests lab be changed to [facility].    Future Appointments   Date Time Provider Kailash Claudia   1/9/2024 12:40 PM Matilde Cornejo MD [facility]     LOV 10/13/23   Chest Pain  +2    Last Labs 6/9 and 6/17/23

## 2023-12-21 NOTE — TELEPHONE ENCOUNTER
Pt calling to ask if we can order her labs for her physical ahead of time. She could not get in this year for PE, but has it for beginning of January. Met deductible and wants to do this year. Also mentioned possibly getting into menopause and getting her hormones checked. Advised I will try to ask but no guarantee as 's normally don't order ahead.  Please advise

## 2023-12-21 NOTE — TELEPHONE ENCOUNTER
Only routine labs ordered. We do not order hormonal testing fr perimenopause. Patient needs to keep her annual appointment in January.

## 2023-12-29 LAB
ABSOLUTE BASOPHILS: 50 CELLS/UL (ref 0–200)
ABSOLUTE EOSINOPHILS: 149 CELLS/UL (ref 15–500)
ABSOLUTE LYMPHOCYTES: 1916 CELLS/UL (ref 850–3900)
ABSOLUTE MONOCYTES: 614 CELLS/UL (ref 200–950)
ABSOLUTE NEUTROPHILS: 3472 CELLS/UL (ref 1500–7800)
ALBUMIN/GLOBULIN RATIO: 1.5 (CALC) (ref 1–2.5)
ALBUMIN: 3.8 G/DL (ref 3.6–5.1)
ALKALINE PHOSPHATASE: 53 U/L (ref 31–125)
ALT: 16 U/L (ref 6–29)
AST: 15 U/L (ref 10–35)
BASOPHILS: 0.8 %
BILIRUBIN, TOTAL: 0.8 MG/DL (ref 0.2–1.2)
BUN: 11 MG/DL (ref 7–25)
CALCIUM: 8.8 MG/DL (ref 8.6–10.2)
CARBON DIOXIDE: 26 MMOL/L (ref 20–32)
CHLORIDE: 102 MMOL/L (ref 98–110)
CHOL/HDLC RATIO: 2.9 (CALC)
CHOLESTEROL, TOTAL: 173 MG/DL
CREATININE: 0.59 MG/DL (ref 0.5–0.99)
EGFR: 112 ML/MIN/1.73M2
EOSINOPHILS: 2.4 %
GLOBULIN: 2.6 G/DL (CALC) (ref 1.9–3.7)
GLUCOSE: 99 MG/DL (ref 65–99)
HDL CHOLESTEROL: 59 MG/DL
HEMATOCRIT: 37.9 % (ref 35–45)
HEMOGLOBIN: 12.3 G/DL (ref 11.7–15.5)
LDL-CHOLESTEROL: 90 MG/DL (CALC)
LYMPHOCYTES: 30.9 %
MCH: 28.8 PG (ref 27–33)
MCHC: 32.5 G/DL (ref 32–36)
MCV: 88.8 FL (ref 80–100)
MONOCYTES: 9.9 %
MPV: 11.2 FL (ref 7.5–12.5)
NEUTROPHILS: 56 %
NON-HDL CHOLESTEROL: 114 MG/DL (CALC)
PLATELET COUNT: 244 THOUSAND/UL (ref 140–400)
POTASSIUM: 4.4 MMOL/L (ref 3.5–5.3)
PROTEIN, TOTAL: 6.4 G/DL (ref 6.1–8.1)
RDW: 12.2 % (ref 11–15)
RED BLOOD CELL COUNT: 4.27 MILLION/UL (ref 3.8–5.1)
SODIUM: 137 MMOL/L (ref 135–146)
TRIGLYCERIDES: 143 MG/DL
TSH: 5.24 MIU/L
WHITE BLOOD CELL COUNT: 6.2 THOUSAND/UL (ref 3.8–10.8)

## 2024-01-09 ENCOUNTER — OFFICE VISIT (OUTPATIENT)
Dept: FAMILY MEDICINE CLINIC | Facility: CLINIC | Age: 49
End: 2024-01-09
Payer: COMMERCIAL

## 2024-01-09 VITALS
HEART RATE: 97 BPM | DIASTOLIC BLOOD PRESSURE: 84 MMHG | SYSTOLIC BLOOD PRESSURE: 110 MMHG | TEMPERATURE: 98 F | OXYGEN SATURATION: 98 % | RESPIRATION RATE: 18 BRPM | WEIGHT: 224 LBS | HEIGHT: 60 IN | BODY MASS INDEX: 43.98 KG/M2

## 2024-01-09 DIAGNOSIS — E61.1 IRON DEFICIENCY: ICD-10-CM

## 2024-01-09 DIAGNOSIS — R94.6 ABNORMAL THYROID FUNCTION TEST: ICD-10-CM

## 2024-01-09 DIAGNOSIS — E55.9 VITAMIN D DEFICIENCY: ICD-10-CM

## 2024-01-09 DIAGNOSIS — E66.01 MORBID OBESITY WITH BMI OF 40.0-44.9, ADULT (HCC): ICD-10-CM

## 2024-01-09 DIAGNOSIS — Z12.11 SCREENING FOR COLON CANCER: ICD-10-CM

## 2024-01-09 DIAGNOSIS — Z12.31 VISIT FOR SCREENING MAMMOGRAM: ICD-10-CM

## 2024-01-09 DIAGNOSIS — Z00.00 ROUTINE GENERAL MEDICAL EXAMINATION AT A HEALTH CARE FACILITY: Primary | ICD-10-CM

## 2024-01-09 DIAGNOSIS — N95.1 PERIMENOPAUSE: ICD-10-CM

## 2024-01-09 PROCEDURE — 3079F DIAST BP 80-89 MM HG: CPT | Performed by: FAMILY MEDICINE

## 2024-01-09 PROCEDURE — 3074F SYST BP LT 130 MM HG: CPT | Performed by: FAMILY MEDICINE

## 2024-01-09 PROCEDURE — 3008F BODY MASS INDEX DOCD: CPT | Performed by: FAMILY MEDICINE

## 2024-01-09 PROCEDURE — 99396 PREV VISIT EST AGE 40-64: CPT | Performed by: FAMILY MEDICINE

## 2024-01-09 NOTE — PROGRESS NOTES
Karen Vasquez is a 48 year old female.  Chief Complaint   Patient presents with    Physical       HPI:   Karen Vasquez is a 48 year old female with no significant past medical history seen for her annual physical.  Pap done in   was normal, menstrual cycle has been irregular.  Does do breast self exam, no family history of breast cancer.  Denies any constipation or blood in stool, no family history of colon cancer.    Tries to eat healthy but has not been exercising.    PAST MEDICAL HISTORY:     Past Medical History:   Diagnosis Date    Obesity      PAST SURGICAL HISTORY:     Past Surgical History:   Procedure Laterality Date            2009    SURGERY - [facility] Bilateral 2013    Essure for sterilization     ALLERGY:   No Known Allergies  MEDICATIONS:     No current outpatient medications on file.     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   HENT:  Negative for congestion, ear pain, hearing loss and sore throat.    Eyes:  Negative for discharge, redness and visual disturbance.   Respiratory:  Negative for cough, chest tightness and shortness of breath.    Cardiovascular:   Negative for chest pain and palpitations.   Gastrointestinal:  Negative for abdominal pain, blood in stool, constipation and nausea.   Endocrine: Negative for cold intolerance, heat intolerance and polyuria.   Genitourinary:  Negative for difficulty urinating, dysuria, frequency and urgency.   Musculoskeletal:  Negative for arthralgias, gait problem, joint swelling and myalgias.   Skin:  Negative for rash.   Allergic/Immunologic: Negative for food allergies.   Neurological:  Negative for dizziness, weakness, numbness and headaches.   Hematological:  Negative for adenopathy. Does not bruise/bleed easily.   Psychiatric/Behavioral:  Negative for dysphoric mood and sleep disturbance. The patient is not nervous/anxious.         PHYSICAL EXAM:   /84   Pulse 97   Temp 98 °F (36.7 °C) (Tympanic)   Resp 18   Ht 5' (1.524 m)   Wt 224 lb (101.6 kg)   LMP 12/31/2023   SpO2 98%   BMI 43.75 kg/m²     Physical Exam  Constitutional:       General: She is not in acute distress.     Appearance: Normal appearance. She is well-developed. She is obese.   HENT:      Head: Normocephalic and atraumatic.      Right Ear: Tympanic membrane, ear canal and external ear normal.      Left Ear: Tympanic membrane, ear canal and external ear normal.      Nose: Nose normal.      Mouth/Throat:      Mouth: Mucous membranes are moist.      Pharynx: Oropharynx is clear.   Eyes:      Extraocular Movements: Extraocular movements intact.      Conjunctiva/sclera: Conjunctivae normal.      Pupils: Pupils are equal, round, and reactive to light.   Neck:      Thyroid: No thyromegaly.   Cardiovascular:      Rate and Rhythm: Normal rate and regular rhythm.      Pulses: Normal pulses.      Heart sounds: Normal heart sounds. No murmur heard.  Pulmonary:      Effort: Pulmonary effort is normal. No respiratory distress.      Breath sounds: Normal breath sounds.   Chest:      Chest wall: No tenderness.   Breasts:     Right: Normal. No swelling, inverted  nipple, mass, nipple discharge, skin change or tenderness.      Left: Normal. No swelling, inverted nipple, mass, nipple discharge, skin change or tenderness.   Abdominal:      General: Bowel sounds are normal. There is no distension.      Palpations: Abdomen is soft. There is no mass.      Tenderness: There is no abdominal tenderness.      Comments: No HSM   Musculoskeletal:         General: Normal range of motion.      Cervical back: Normal range of motion and neck supple.      Right lower leg: No edema.      Left lower leg: No edema.   Lymphadenopathy:      Cervical: No cervical adenopathy.      Upper Body:      Right upper body: No supraclavicular, axillary or pectoral adenopathy.      Left upper body: No supraclavicular, axillary or pectoral adenopathy.   Skin:     General: Skin is warm.      Findings: No rash.   Neurological:      General: No focal deficit present.      Mental Status: She is alert and oriented to person, place, and time.      Deep Tendon Reflexes: Reflexes are normal and symmetric.   Psychiatric:         Mood and Affect: Mood normal.         Behavior: Behavior normal.         Thought Content: Thought content normal.         Judgment: Judgment normal.       LABS AND IMAGING:     Component      Latest Ref [facility] 12/28/2023   WBC      3.8 - 10.8 Thousand/uL 6.2    RBC      3.80 - 5.10 Million/uL 4.27    Hemoglobin      11.7 - 15.5 g/dL 12.3    Hematocrit      35.0 - 45.0 % 37.9    MCV      80.0 - 100.0 fL 88.8    MCH      27.0 - 33.0 pg 28.8    MCHC      32.0 - 36.0 g/dL 32.5    RDW      11.0 - 15.0 % 12.2    Platelet Count      140 - 400 Thousand/uL 244    MPV      7.5 - 12.5 fL 11.2    Neutrophils Absolute      1,500 - 7,800 cells/uL 3,472    Lymphocytes Absolute      850 - 3,900 cells/uL 1,916    Monocytes Absolute      200 - 950 cells/uL 614    Eosinophils Absolute      15 - 500 cells/uL 149    Basophils Absolute      0 - 200 cells/uL 50    Neutrophils %      % 56    Lymphocytes %      % 30.9     Monocytes %      % 9.9    Eosinophils %      % 2.4    Basophils %      % 0.8    Glucose      65 - 99 mg/dL 99    BUN      7 - 25 mg/dL 11    CREATININE      0.50 - 0.99 mg/dL 0.59    EGFR      > OR = 60 mL/min/1.73m2 112    BUN/CREATININE RATIO      6 - 22 (calc) SEE NOTE:    Sodium      135 - 146 mmol/L 137    Potassium      3.5 - 5.3 mmol/L 4.4    Chloride      98 - 110 mmol/L 102    Carbon Dioxide, Total      20 - 32 mmol/L 26    CALCIUM      8.6 - 10.2 mg/dL 8.8    PROTEIN, TOTAL      6.1 - 8.1 g/dL 6.4    Albumin      3.6 - 5.1 g/dL 3.8    Globulin      1.9 - 3.7 g/dL (calc) 2.6    A/G Ratio      1.0 - 2.5 (calc) 1.5    Total Bilirubin      0.2 - 1.2 mg/dL 0.8    Alkaline Phosphatase      31 - 125 U/L 53    AST (SGOT)      10 - 35 U/L 15    ALT (SGPT)      6 - 29 U/L 16    Cholesterol, Total      <200 mg/dL 173    HDL Cholesterol      > OR = 50 mg/dL 59    Triglycerides      <150 mg/dL 143    LDL Cholesterol Calc      mg/dL (calc) 90    Chol/HDL Ratio      <5.0 (calc) 2.9    NON-HDL CHOLESTEROL      <130 mg/dL (calc) 114    TSH      mIU/L 5.24 (H)       Legend:  (H) High  ASSESSMENT AND PLAN:   Karen was seen today for physical.    Diagnoses and all orders for this visit:    Routine general medical examination at a health care facility    Screening for colon cancer  -     Occult Blood, Fecal, FIT Immunoassay (Blue Cards)  -     Gastro Referral - In Network    Visit for screening mammogram  -     3D Mammogram Digital Screen, Bilateral (CPT=77067/31499); Future    Morbid obesity with BMI of 40.0-44.9, adult (HCC)       - encouraged healthy portion controlled diet and exercise.    Abnormal thyroid function test  -     Free T3 (Triiodothryronine); Future  -     Free T4, (Free Thyroxine); Future  -     Assay, Thyroid Stim Hormone; Future  -     Thyroid peroxidase & thyroglobulin ab; Future  -     Free T3 (Triiodothryronine)  -     Free T4, (Free Thyroxine)  -     Assay, Thyroid Stim Hormone  -     Thyroid  peroxidase & thyroglobulin ab    Iron deficiency  -     IRON AND TIBC [7573] [Q]; Future  -     FERRITIN [457] [Q]; Future  -     IRON AND TIBC [7573] [Q]  -     FERRITIN [457] [Q]    Perimenopause      - reassured patient    Vitamin D deficiency  -     VITAMIN D, 25-HYDROXY [34508][Q]; Future  -     VITAMIN D, 25-HYDROXY [14135][Q]    Age appropriate anticipatory guidance reviewed with patient  Health Maintenance   Topic Date Due    Colorectal Cancer Screening  Never done    Annual Physical  Done today.      Mammogram  10/03/2020    COVID-19 Vaccine (4 - 2023-24 season) 09/01/2023    Pap Smear  12/27/2025    DTaP,Tdap,and Td Vaccines (2 - Td or Tdap) 01/12/2028    Influenza Vaccine  Completed    Annual Depression Screening  Completed    Pneumococcal Vaccine: Birth to 64yrs  Aged Out        The 21st Century Cures Act makes medical notes like these available to patients in the interest of transparency. Please be advised this is a medical document. Medical documents are intended to carry relevant information, facts as evident, and the clinical opinion of the practitioner. The medical note is intended as peer to peer communication and may appear blunt or direct. It is written in medical language and may contain abbreviations or verbiage that are unfamiliar. '

## 2024-03-10 ENCOUNTER — OFFICE VISIT (OUTPATIENT)
Dept: FAMILY MEDICINE CLINIC | Facility: CLINIC | Age: 49
End: 2024-03-10
Payer: COMMERCIAL

## 2024-03-10 VITALS
HEIGHT: 60 IN | TEMPERATURE: 98 F | BODY MASS INDEX: 43.98 KG/M2 | SYSTOLIC BLOOD PRESSURE: 130 MMHG | DIASTOLIC BLOOD PRESSURE: 86 MMHG | RESPIRATION RATE: 18 BRPM | HEART RATE: 87 BPM | WEIGHT: 224 LBS | OXYGEN SATURATION: 100 %

## 2024-03-10 DIAGNOSIS — R39.9 UTI SYMPTOMS: Primary | ICD-10-CM

## 2024-03-10 LAB
APPEARANCE: CLEAR
BILIRUBIN: NEGATIVE
GLUCOSE (URINE DIPSTICK): NEGATIVE MG/DL
KETONES (URINE DIPSTICK): NEGATIVE MG/DL
LEUKOCYTES: NEGATIVE
MULTISTIX LOT#: NORMAL NUMERIC
NITRITE, URINE: NEGATIVE
OCCULT BLOOD: NEGATIVE
PH, URINE: 6 (ref 4.5–8)
PROTEIN (URINE DIPSTICK): NEGATIVE MG/DL
SPECIFIC GRAVITY: 1.02 (ref 1–1.03)
URINE-COLOR: YELLOW
UROBILINOGEN,SEMI-QN: 0.2 MG/DL (ref 0–1.9)

## 2024-03-10 PROCEDURE — 3008F BODY MASS INDEX DOCD: CPT | Performed by: NURSE PRACTITIONER

## 2024-03-10 PROCEDURE — 87086 URINE CULTURE/COLONY COUNT: CPT | Performed by: NURSE PRACTITIONER

## 2024-03-10 PROCEDURE — 3079F DIAST BP 80-89 MM HG: CPT | Performed by: NURSE PRACTITIONER

## 2024-03-10 PROCEDURE — 3075F SYST BP GE 130 - 139MM HG: CPT | Performed by: NURSE PRACTITIONER

## 2024-03-10 PROCEDURE — 99213 OFFICE O/P EST LOW 20 MIN: CPT | Performed by: NURSE PRACTITIONER

## 2024-03-10 PROCEDURE — 81003 URINALYSIS AUTO W/O SCOPE: CPT | Performed by: NURSE PRACTITIONER
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
## 2024-05-22 ENCOUNTER — NURSE TRIAGE (OUTPATIENT)
Dept: FAMILY MEDICINE CLINIC | Facility: CLINIC | Age: 49
End: 2024-05-22

## 2024-05-22 NOTE — TELEPHONE ENCOUNTER
Action Requested: Summary for Provider     []  Critical Lab, Recommendations Needed  [] Need Additional Advice  []   FYI    []   Need Orders  [] Need Medications Sent to Pharmacy  []  Other     SUMMARY: insect bite  Triage call transferred.   Spoke with pt got bit by insect and experiencing itching, redness, slight pain, tender upon touch. No fever. No drainage at this time.   PCP book today and rest of week.  Pt requesting to see another provider.   Pt will upload pic via [employer].  Informed will relay to PCP to see if able to accommodate pt, to go to UC, or  further recommendations.  Advised if sx worsen or develop fever, difficulty breathing or swallowing, to go to ER.   No further questions or concerns. Pt verbalized understanding and agreed with POC.    Please advise. Thank you.      Reason for Disposition   Red or very tender (to touch) area, getting larger over 48 hours after the bite    Answer Assessment - Initial Assessment Questions  1. TYPE of INSECT: \"What type of insect was it?\"       unknown  2. ONSET: \"When did you get bitten?\"       Sunday (5/19/24)  3. LOCATION: \"Where is the insect bite located?\"       Left leg  4. REDNESS: \"Is the area red or pink?\" If Yes, ask: \"What size is area of redness?\" (inches or cm). \"When did the redness start?\"      Redness, 6inches  5. PAIN: \"Is there any pain?\" If Yes, ask: \"How bad is it?\"  (Scale 1-10; or mild, moderate, severe)      Pain 5/10  6. ITCHING: \"Does it itch?\" If Yes, ask: \"How bad is the itch?\"     - MILD: doesn't interfere with normal activities    - MODERATE-SEVERE: interferes with work, school, sleep, or other activities       Mild itching   7. SWELLING: \"How big is the swelling?\" (inches, cm, or compare to coins)      Swelling, size unknown  8. OTHER SYMPTOMS: \"Do you have any other symptoms?\"  (e.g., difficulty breathing, hives)      None   9. PREGNANCY: \"Is there any chance you are pregnant?\" \"When was your last menstrual period?\"       No    Protocols used: Insect Bite-A-OH

## 2024-05-24 PROBLEM — Z12.11 SPECIAL SCREENING FOR MALIGNANT NEOPLASM OF COLON: Status: ACTIVE | Noted: 2024-05-24

## 2024-05-31 ENCOUNTER — TELEPHONE (OUTPATIENT)
Dept: INTERNAL MEDICINE CLINIC | Facility: CLINIC | Age: 49
End: 2024-05-31
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
## 2024-06-03 ENCOUNTER — OFFICE VISIT (OUTPATIENT)
Dept: FAMILY MEDICINE CLINIC | Facility: CLINIC | Age: 49
End: 2024-06-03
Payer: COMMERCIAL

## 2024-06-03 VITALS
BODY MASS INDEX: 41.23 KG/M2 | HEIGHT: 60 IN | DIASTOLIC BLOOD PRESSURE: 70 MMHG | WEIGHT: 210 LBS | SYSTOLIC BLOOD PRESSURE: 108 MMHG | OXYGEN SATURATION: 98 % | TEMPERATURE: 98 F | HEART RATE: 99 BPM | RESPIRATION RATE: 16 BRPM

## 2024-06-03 DIAGNOSIS — T14.8XXA HEMATOMA: ICD-10-CM

## 2024-06-03 DIAGNOSIS — S09.90XA INJURY OF HEAD, INITIAL ENCOUNTER: Primary | ICD-10-CM
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
## 2024-06-03 PROCEDURE — 99214 OFFICE O/P EST MOD 30 MIN: CPT | Performed by: FAMILY MEDICINE

## 2024-06-03 PROCEDURE — 3074F SYST BP LT 130 MM HG: CPT | Performed by: FAMILY MEDICINE

## 2024-06-03 PROCEDURE — 3078F DIAST BP <80 MM HG: CPT | Performed by: FAMILY MEDICINE

## 2024-06-03 PROCEDURE — 3008F BODY MASS INDEX DOCD: CPT | Performed by: FAMILY MEDICINE
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
## 2024-06-07 ENCOUNTER — HOSPITAL ENCOUNTER (OUTPATIENT)
Dept: CT IMAGING | Facility: HOSPITAL | Age: 49
Discharge: HOME | End: 2024-06-07
Attending: FAMILY MEDICINE
Payer: COMMERCIAL

## 2024-06-07 ENCOUNTER — TELEPHONE (OUTPATIENT)
Dept: INTERNAL MEDICINE CLINIC | Facility: CLINIC | Age: 49
End: 2024-06-07

## 2024-06-07 DIAGNOSIS — R42 DIZZINESS: ICD-10-CM

## 2024-06-07 DIAGNOSIS — R42 DIZZINESS: Primary | ICD-10-CM

## 2024-06-07 PROCEDURE — 70450 CT HEAD/BRAIN W/O DYE: CPT | Performed by: FAMILY MEDICINE

## 2024-06-07 NOTE — TELEPHONE ENCOUNTER
LOV 6/3/24     Received call from pt. Pt stated she saw Dr. Stephen on Monday, she was advised to keep us updated on her symptoms and let us know if any new symptoms occur. Pt stated since Monday she is having on and off episodes of dizziness and blurred vision. Stated it will last 15-30 sec and resolve. Happens once daily in the evening. Does not currently have any symptoms. Denies headache, LOV, nausea and vomiting. Wondering if she needs any imaging. Also stated she felt \"tightness\" to the L side of her head for a few seconds.     Dr. Stephen - Pt having episodes of dizziness and blurry vision, do you recommend imaging at this time? Or should she go to UC/ER for eval?

## 2024-06-07 NOTE — TELEPHONE ENCOUNTER
Radiology called with STAT CT brain results. Results show no intracranial abnormalities.  Routing to ordering provider.

## 2024-06-07 NOTE — TELEPHONE ENCOUNTER
Received call back from pt. Informed her CT shows no intracranial abnormalities. Advised to stay hydrated and can take tylenol PRN. Pt stated understanding and agreed to plan.

## 2024-06-07 NOTE — TELEPHONE ENCOUNTER
Called and spoke to pt. Informed her STAT CT of head has been ordered, advised to call to schedule asap. Per pt, she will call now to schedule. Advised to contact us if she has any problems getting timely appt. Pt appreciative.

## 2024-06-07 NOTE — TELEPHONE ENCOUNTER
Per Dr Stephen, OK, let the patient know. Ask her to keep herself hydrated. Take Tylenol.       Attempted to call patient, unable to leave message as VM is full.  [employer] message sent to patient.

## 2024-06-10 NOTE — PROGRESS NOTES
Patient has been notified of the results Topical Sulfur Applications Counseling: Topical Sulfur Counseling: Patient counseled that this medication may cause skin irritation or allergic reactions.  In the event of skin irritation, the patient was advised to reduce the amount of the drug applied or use it less frequently.   The patient verbalized understanding of the proper use and possible adverse effects of topical sulfur application.  All of the patient's questions and concerns were addressed.

## 2024-06-11 ENCOUNTER — TELEPHONE (OUTPATIENT)
Dept: FAMILY MEDICINE CLINIC | Facility: CLINIC | Age: 49
End: 2024-06-11
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
## 2024-09-30 ENCOUNTER — HOSPITAL ENCOUNTER (OUTPATIENT)
Dept: GENERAL RADIOLOGY | Age: 49
Discharge: HOME | End: 2024-09-30
Attending: FAMILY MEDICINE
Payer: COMMERCIAL

## 2024-09-30 ENCOUNTER — OFFICE VISIT (OUTPATIENT)
Dept: FAMILY MEDICINE CLINIC | Facility: CLINIC | Age: 49
End: 2024-09-30
Payer: COMMERCIAL

## 2024-09-30 VITALS
HEIGHT: 60 IN | TEMPERATURE: 97 F | OXYGEN SATURATION: 98 % | WEIGHT: 214 LBS | HEART RATE: 99 BPM | DIASTOLIC BLOOD PRESSURE: 80 MMHG | RESPIRATION RATE: 18 BRPM | SYSTOLIC BLOOD PRESSURE: 120 MMHG | BODY MASS INDEX: 42.01 KG/M2

## 2024-09-30 DIAGNOSIS — R53.83 OTHER FATIGUE: ICD-10-CM

## 2024-09-30 DIAGNOSIS — R20.2 PARESTHESIA OF ARM: Primary | ICD-10-CM

## 2024-09-30 DIAGNOSIS — R20.2 PARESTHESIA OF ARM: ICD-10-CM

## 2024-09-30 DIAGNOSIS — Z23 NEED FOR VACCINATION: ICD-10-CM

## 2024-09-30 DIAGNOSIS — R79.89 ABNORMAL THYROID BLOOD TEST: ICD-10-CM

## 2024-09-30 PROCEDURE — 90656 IIV3 VACC NO PRSV 0.5 ML IM: CPT | Performed by: FAMILY MEDICINE

## 2024-09-30 PROCEDURE — 3074F SYST BP LT 130 MM HG: CPT | Performed by: FAMILY MEDICINE

## 2024-09-30 PROCEDURE — 90471 IMMUNIZATION ADMIN: CPT | Performed by: FAMILY MEDICINE

## 2024-09-30 PROCEDURE — 99213 OFFICE O/P EST LOW 20 MIN: CPT | Performed by: FAMILY MEDICINE

## 2024-09-30 PROCEDURE — 3079F DIAST BP 80-89 MM HG: CPT | Performed by: FAMILY MEDICINE

## 2024-09-30 PROCEDURE — 3008F BODY MASS INDEX DOCD: CPT | Performed by: FAMILY MEDICINE

## 2024-09-30 PROCEDURE — 72050 X-RAY EXAM NECK SPINE 4/5VWS: CPT | Performed by: FAMILY MEDICINE

## 2024-09-30 NOTE — PROGRESS NOTES
Family Medicine Progress Note  ASSESSMENT AND PLAN:  Karen Vasquez is a 49 year old female who is here for:     Karen was seen today for leg pain.    Diagnoses and all orders for this visit:    Paresthesia of arm  Comments:  left  Orders:  -     XR CERVICAL SPINE (4VIEWS) (CPT=72050); Future  -     discussed with patient her paresthesia I likely due to pinched nerve in the cervical area  -     advisd to avoid using a pillow at night.    Other fatigue  -     IRON AND TIBC [7573] [Q]  -     FERRITIN [457] [Q]  -     Vitamin D, 25-Hydroxy    Abnormal thyroid blood test  -     Free T3 (Triiodothryronine)  -     Free T4, (Free Thyroxine)  -     Assay, Thyroid Stim Hormone  -     Thyroid peroxidase & thyroglobulin ab    Need for vaccination  -     INFLUENZA VACCINE, TRI, PRESERV FREE, 0.5 ML         The patient indicates understanding of these issues and agrees to the plan.  Follow-Up: The patient is asked to return in Return for annual.    .     Janene Quintero MD   09/30/24      CC: Leg Pain (cramping)    HPI:   Karen Vasquez is a 49 year old female complaining of fatigue since August, states is not able to workout due to fatigue. Does have history of iron deficiency and vitamin D deficiency. Thyroid function was abnormal in January and patient was advised to recheck labs in 1 month but never followed up on it.    Cold sensation in the left arm elbow area, does have chronic neck pain that radiated down her right arm, symptom in the left arm is new.  is fine during the day but feels it when she lies down at night, uses 1 pillow.    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    ALLERGY:   No Known Allergies    MEDICATIONS:     No current outpatient medications on file.      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   Tobacco Use    Smoking status: Never    Smokeless tobacco: Never   Vaping Use    Vaping status: Never Used   Substance and Sexual Activity    Alcohol use: No    Drug use: No    Sexual activity: Yes     Partners: Male     Comment: essure    Social History Narrative    Poor diet habits no exrcise     Social Determinants of Health      Received from [facility], [facility]    Social Connections    Received from [facility], [facility]    Housing Stability        REVIEW OF SYSTEMS:   A comprehensive 10 point review of systems was completed.  Pertinent positives and negatives noted in the the HPI.      EXAM:   /80   Pulse 99   Temp 97 °F (36.1 °C) (Temporal)   Resp 18   Ht 5' (1.524 m)   Wt 214 lb (97.1 kg)   LMP 09/07/2024 (Exact Date)   SpO2 98%   BMI 41.79 kg/m²   GENERAL: morbidly obese,in no apparent distress  NECK: supple,no tenderness to palpation over the cervical spine, normal ROM  LUNGS: clear to auscultation  CARDIO: RRR without murmur  EXTREMITIES: no cyanosis, clubbing or edema  NEURO: normal sensation and strength left UE, DTR 2+    NOTE TO PATIENT: The 21st Century Cures Act makes clinical notes like these available to patients in the interest of transparency. Clinical notes are medical documents used by physicians and care providers to communicate with each other. These documents include medical language and terminology, abbreviations, and treatment information that may sound technical and at times possibly unfamiliar. In addition, at times, the verbiage may appear blunt or direct. These documents are one tool providers use to communicate relevant information and clinical opinions of the care providers in a way that allows common understanding of the clinical context.      Janene Quintero MD    09/30/24  10:13 AM

## 2024-10-07 ENCOUNTER — PATIENT MESSAGE (OUTPATIENT)
Dept: FAMILY MEDICINE CLINIC | Facility: CLINIC | Age: 49
End: 2024-10-07

## 2024-10-08 NOTE — TELEPHONE ENCOUNTER
From: Karen Vasquez  To: Janene Quintero  Sent: 10/7/2024 9:48 AM CDT  Subject: Thyroid test    Hi ,  I was wondering if I can get a follow up on my thyroid, hemoglobin and VitD3 test results.  Thank You,  Karen.

## 2024-10-09 NOTE — TELEPHONE ENCOUNTER
Appears [facility] only completed labs ordered by Dr. Garcia.     Dr. Quintero - Can you review the labs that were completed? Assume pt will need to return to lab to complete Vit D and thyroid testing since those were not drawn?

## 2024-10-10 NOTE — TELEPHONE ENCOUNTER
Mild anemia, iron levels are normal but ferritin is still very low, patient to continue to take otc ferrous sulfate.  Vitamin B12 is low, patient can take over the counter B12 1000 mcg daily.  Needs to go back to the lab to get other labs drawn.

## 2024-10-17 LAB
% SATURATION: 12 % (CALC) (ref 16–45)
FERRITIN: 6 NG/ML (ref 16–232)
IRON BINDING CAPACITY: 447 MCG/DL (CALC) (ref 250–450)
IRON, TOTAL: 52 MCG/DL (ref 40–190)
T3, FREE: 3.2 PG/ML (ref 2.3–4.2)
T4, FREE: 0.9 NG/DL (ref 0.8–1.8)
THYROGLOBULIN ANTIBODIES: <1 IU/ML
THYROID PEROXIDASE$ANTIBODIES: 1 IU/ML
TSH: 4.85 MIU/L
VITAMIN D, 25-OH, TOTAL: 24 NG/ML (ref 30–100)

## 2024-10-23 ENCOUNTER — OFFICE VISIT (OUTPATIENT)
Dept: FAMILY MEDICINE CLINIC | Facility: CLINIC | Age: 49
End: 2024-10-23
Payer: COMMERCIAL

## 2024-10-23 VITALS
BODY MASS INDEX: 42.21 KG/M2 | OXYGEN SATURATION: 98 % | HEIGHT: 60 IN | RESPIRATION RATE: 18 BRPM | WEIGHT: 215 LBS | DIASTOLIC BLOOD PRESSURE: 80 MMHG | TEMPERATURE: 98 F | HEART RATE: 82 BPM | SYSTOLIC BLOOD PRESSURE: 130 MMHG

## 2024-10-23 DIAGNOSIS — E55.9 VITAMIN D DEFICIENCY: ICD-10-CM

## 2024-10-23 DIAGNOSIS — E53.8 VITAMIN B12 DEFICIENCY: ICD-10-CM

## 2024-10-23 DIAGNOSIS — E61.1 IRON DEFICIENCY: ICD-10-CM

## 2024-10-23 DIAGNOSIS — E03.9 HYPOTHYROIDISM, UNSPECIFIED TYPE: Primary | ICD-10-CM

## 2024-10-23 DIAGNOSIS — L65.9 HAIR LOSS: ICD-10-CM

## 2024-10-23 DIAGNOSIS — R53.83 FATIGUE, UNSPECIFIED TYPE: ICD-10-CM

## 2024-10-23 PROCEDURE — 3008F BODY MASS INDEX DOCD: CPT | Performed by: FAMILY MEDICINE

## 2024-10-23 PROCEDURE — 3079F DIAST BP 80-89 MM HG: CPT | Performed by: FAMILY MEDICINE

## 2024-10-23 PROCEDURE — 3075F SYST BP GE 130 - 139MM HG: CPT | Performed by: FAMILY MEDICINE

## 2024-10-23 PROCEDURE — 99214 OFFICE O/P EST MOD 30 MIN: CPT | Performed by: FAMILY MEDICINE

## 2024-10-23 RX ORDER — ERGOCALCIFEROL 1.25 MG/1
50000 CAPSULE, LIQUID FILLED ORAL WEEKLY
Qty: 12 CAPSULE | Refills: 0 | Status: SHIPPED | OUTPATIENT
Start: 2024-10-23

## 2024-10-23 RX ORDER — LEVOTHYROXINE SODIUM 25 UG/1
25 TABLET ORAL
Qty: 60 TABLET | Refills: 0 | Status: SHIPPED | OUTPATIENT
Start: 2024-10-23

## 2024-10-23 NOTE — PROGRESS NOTES
Family Medicine Progress Note  ASSESSMENT AND PLAN:  Karen Vasquez is a 49 year old female who is here for:     Karen was seen today for lab results.    Diagnoses and all orders for this visit:    Hypothyroidism, unspecified type  -     levothyroxine 25 MCG Oral Tab; Take 1 tablet (25 mcg total) by mouth before breakfast.  -     Assay, Thyroid Stim Hormone; Future  -     Free T4, (Free Thyroxine); Future  -     Assay, Thyroid Stim Hormone  -     Free T4, (Free Thyroxine)  -     started on levothyroxine as patient is symptomatic  -     advised to take every morning on an empty stomach  -     repeat labs in 6 weeks    Vitamin D deficiency  -     ergocalciferol 1.25 MG (49207 UT) Oral Cap; Take 1 capsule (50,000 Units total) by mouth once a week.    Iron deficiency        - advised to take over the counter ferrous sulfate 325 mg daily        - increase iron rich foods in diet.    Vitamin B12 deficiency       - advised to take otc vitamin B12 1000 mcg daily    Fatigue, unspecified type    Hair loss         The patient indicates understanding of these issues and agrees to the plan.  Follow-Up: The patient is asked to  Return in about 3 months (around 1/23/2025) for Hypothyroidism f/u.    .     Janene Quintero MD   10/23/24      CC: Lab Results    HPI:   Karen Vasquez is a 49 year old female who presents for Lab Results     Patient states has been feeling very fatigued, does not feel motivated to do things, feels like she might be slightly depressed, recently has noticed more hair loss.  Patient states did lose some weight when she was eating healthy but has gained it back again and now feels like is struggling to lose weight.  Denies constipation.  States started a new job as an  and states she feels very tired all the time has been feeling it difficult to get things done.    ALLERGY   Allergies[1]    MEDICATIONS:     No current outpatient medications on file.      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   Tobacco Use    Smoking status: Never    Smokeless tobacco: Never   Vaping Use    Vaping status: Never Used   Substance and Sexual Activity    Alcohol use: No    Drug use: No    Sexual activity: Yes     Partners: Male     Comment: essure    Social History Narrative    Poor diet habits no exrcise     Social Drivers of Health      Received from [facility], [facility]    Social Connections    Received from [facility], [facility]    Housing Stability        REVIEW OF SYSTEMS:   A comprehensive 10 point review of systems was completed.  Pertinent positives and negatives noted in the the HPI.      EXAM:   /80   Pulse 82   Temp 98 °F (36.7 °C) (Temporal)   Resp 18   Ht 5' (1.524 m)   Wt 215 lb (97.5 kg)   LMP 10/08/2024 (Exact Date)   SpO2 98%   BMI 41.99 kg/m²   GENERAL: morbid obesity, in no apparent distress  SKIN: no rashes,no suspicious lesions  HEENT: atraumatic, normocephalic,ears and throat are clear  NECK: supple,no thyromegaly  LUNGS: clear to auscultation  CARDIO: RRR without murmur  GI: good BS's,no masses, HSM or tenderness  EXTREMITIES: no cyanosis, clubbing or edema  PSYCH: well groomed, appropriate mood and affect.    Component      Latest Ref Rng 10/4/2024 10/16/2024   IRON, TOTAL      40 - 190 mcg/dL  52    IRON BINDING CAPACITY      250 - 450 mcg/dL (calc)  447    % SATURATION      16 - 45 % (calc)  12 (L)    THYROGLOBULIN ANTIBODIES      < or = 1 IU/mL  <1    THYROID PEROXIDASE$ANTIBODIES      <9 IU/mL  1    INTRINSIC FACTOR BLOCKING$ANTIBODY NEGATIVE     T3 FREE      2.3 - 4.2 pg/mL  3.2    T4,Free (Direct)      0.8 - 1.8 ng/dL  0.9    TSH      mIU/L  4.85 (H)    FERRITIN      16 - 232  ng/mL  6 (L)    VITAMIN D, 25-OH, TOTAL      30 - 100 ng/mL  24 (L)         NOTE TO PATIENT: The 21st Century Cures Act makes clinical notes like these available to patients in the interest of transparency. Clinical notes are medical documents used by physicians and care providers to communicate with each other. These documents include medical language and terminology, abbreviations, and treatment information that may sound technical and at times possibly unfamiliar. In addition, at times, the verbiage may appear blunt or direct. These documents are one tool providers use to communicate relevant information and clinical opinions of the care providers in a way that allows common understanding of the clinical context.      Janene Quintero MD    10/23/24 3:36 PM             [1] No Known Allergies

## 2024-12-18 DIAGNOSIS — E03.9 HYPOTHYROIDISM, UNSPECIFIED TYPE: ICD-10-CM

## 2024-12-23 RX ORDER — LEVOTHYROXINE SODIUM 25 UG/1
25 TABLET ORAL
Qty: 90 TABLET | Refills: 0 | Status: SHIPPED | OUTPATIENT
Start: 2024-12-23

## 2024-12-23 NOTE — TELEPHONE ENCOUNTER
Patient called about medication for   Levothyroxine Sodium 25 mcg   She's out of medication   Next appointment on 1/13/25 at 7:40am

## 2024-12-23 NOTE — TELEPHONE ENCOUNTER
Please review; protocol failed/No Protocol    Routing to on call provider Dr. Quintero out of office    Patient is out of medication     Sent go[facility]art message to patient to complete labs from 10/23/2024.    Requested Prescriptions   Pending Prescriptions Disp Refills    LEVOTHYROXINE 25 MCG Oral Tab [Pharmacy Med Name: LEVOTHYROXINE 0.025MG (25MCG) TAB] 60 tablet 0     Sig: TAKE 1 TABLET(25 MCG) BY MOUTH BEFORE BREAKFAST       Thyroid Medication Protocol Failed - 12/23/2024  4:22 PM        Failed - Last TSH value is normal     Lab Results   Component Value Date    TSH 4.85 (H) 10/16/2024                 Passed - TSH in past 12 months        Passed - In person appointment or virtual visit in the past 12 mos or appointment in next 3 mos     Recent Outpatient Visits              1 month ago Colitis    [facility] Ximena Garcia MD    Office Visit    2 months ago Hypothyroidism, unspecified type    [address] Janene Quintero MD    Office Visit    2 months ago Paresthesia of arm    [address] Janene Quintero MD    Office Visit    4 months ago Colitis    [facility] Ximena Garcia MD    Office Visit    6 months ago Injury of head, initial encounter    [address] Monster Stephen MD    Office Visit          Future Appointments         Provider Department Appt Notes    In 3 weeks Janene Quintero MD [address] annual pe                       Future Appointments         Provider Department Appt Notes    In 3 weeks Janene Quintero MD [address] annual pe          Recent Outpatient Visits              1 month ago Colitis    [facility] Ximena Garcia MD    Office Visit    2 months ago Hypothyroidism, unspecified type     [facility], [address] [place] [address], Janene Landis MD    Office Visit    2 months ago Paresthesia of arm    [facility], [address] [place] [address], Janene Landis MD    Office Visit    4 months ago Colitis    [facility],  [facility] Ximena Garcia MD    Office Visit    6 months ago Injury of head, initial encounter    [facility], [address] [place] [address], Monster Adler MD    Office Visit

## 2025-01-20 DIAGNOSIS — E03.9 HYPOTHYROIDISM, UNSPECIFIED TYPE: ICD-10-CM

## 2025-01-20 DIAGNOSIS — E55.9 VITAMIN D DEFICIENCY: ICD-10-CM

## 2025-01-23 RX ORDER — ERGOCALCIFEROL 1.25 MG/1
50000 CAPSULE, LIQUID FILLED ORAL WEEKLY
Qty: 12 CAPSULE | Refills: 0 | OUTPATIENT
Start: 2025-01-23

## 2025-01-23 NOTE — TELEPHONE ENCOUNTER
Please review; protocol failed/ has no protocol    Requested Prescriptions   Pending Prescriptions Disp Refills    ERGOCALCIFEROL 1.25 MG (89620 UT) Oral Cap [Pharmacy Med Name: VITAMIN D2 50,000IU (ERGO) CAP RX] 12 capsule 0     Sig: TAKE 1 CAPSULE BY MOUTH 1 TIME A WEEK       There is no refill protocol information for this order        Recent Outpatient Visits              2 months ago Colitis    [facility] Ximena Garcia MD    Office Visit    3 months ago Hypothyroidism, unspecified type    [address] Janene Landis MD    Office Visit    3 months ago Paresthesia of arm    [address] Janene Quintero MD    Office Visit    5 months ago Colitis    [facility] Ximena Garcia MD    Office Visit    7 months ago Injury of head, initial encounter    [address] Monster Stephen MD    Office Visit          Future Appointments         Provider Department Appt Notes    In 2 months Janene Quintero MD [address] physical

## 2025-01-27 RX ORDER — LEVOTHYROXINE SODIUM 25 UG/1
25 TABLET ORAL
Qty: 30 TABLET | Refills: 1 | Status: SHIPPED | OUTPATIENT
Start: 2025-01-27

## 2025-01-27 NOTE — TELEPHONE ENCOUNTER
Called patient and informed of instructions per Dr. Quintero.  She has taken over the counter Vitamin D3 2000iu in the past.  Advised to restart.  She does not want to have Vitamin D level checked at this time.  States she ran out of her levothyroxine medication yesterday. The pharmacy only gave her #30 pills.  Advised we will call pharmacy to get refilled.  Informed she is also overdue for repeat thyroid labs.  States she is sick with flu and can't go out for labs at this time.  Advised once she restarts levothyroxine and she is feeling better, she should have thyroid labs done. Verbalizes understanding.    Order sent to Pharmacy for levothyroxine #30 with one refill since that is how insurance will cover.   [facility]

## 2025-01-29 ENCOUNTER — TELEMEDICINE (OUTPATIENT)
Dept: FAMILY MEDICINE CLINIC | Facility: CLINIC | Age: 50
End: 2025-01-29
Payer: COMMERCIAL

## 2025-01-29 ENCOUNTER — NURSE TRIAGE (OUTPATIENT)
Dept: FAMILY MEDICINE CLINIC | Facility: CLINIC | Age: 50
End: 2025-01-29

## 2025-01-29 DIAGNOSIS — J06.9 URI WITH COUGH AND CONGESTION: Primary | ICD-10-CM

## 2025-01-29 DIAGNOSIS — J20.8 VIRAL BRONCHITIS: ICD-10-CM

## 2025-01-29 PROCEDURE — 99214 OFFICE O/P EST MOD 30 MIN: CPT | Performed by: FAMILY MEDICINE

## 2025-01-29 RX ORDER — BENZONATATE 200 MG/1
200 CAPSULE ORAL 3 TIMES DAILY PRN
Qty: 21 CAPSULE | Refills: 0 | Status: SHIPPED | OUTPATIENT
Start: 2025-01-29 | End: 2025-02-05

## 2025-01-29 RX ORDER — ALBUTEROL SULFATE 90 UG/1
2 INHALANT RESPIRATORY (INHALATION) EVERY 6 HOURS PRN
Qty: 8 G | Refills: 0 | Status: SHIPPED | OUTPATIENT
Start: 2025-01-29

## 2025-01-29 RX ORDER — PREDNISONE 20 MG/1
20 TABLET ORAL 2 TIMES DAILY
Qty: 10 TABLET | Refills: 0 | Status: SHIPPED | OUTPATIENT
Start: 2025-01-29 | End: 2025-02-03

## 2025-01-29 NOTE — PROGRESS NOTES
Family Medicine Progress Note  ASSESSMENT AND PLAN:  Karen Vasquez is a 50 year old female who is here for:   Karen was seen today for cough, headache and nasal congestion.    Diagnoses and all orders for this visit:    URI with cough and congestion        -Reassured patient viral infection will run its course        -Push fluids, can take OTC nasal decongestant like Sudafed, nasal saline spray and Flonase.    Viral bronchitis  -     benzonatate 200 MG Oral Cap; Take 1 capsule (200 mg total) by mouth 3 (three) times daily as needed.  -     predniSONE 20 MG Oral Tab; Take 1 tablet (20 mg total) by mouth 2 (two) times daily for 5 days.  -     albuterol (PROAIR HFA) 108 (90 Base) MCG/ACT Inhalation Aero Soln; Inhale 2 puffs into the lungs every 6 (six) hours as needed for Wheezing (cough).  -     Discussed with patient if symptoms are not better in the next 3 days can follow-up in the clinic for evaluation.        The patient indicates understanding of these issues and agrees to the plan.  Follow-Up: The patient is asked to Return in about 5 days (around 2/3/2025), or if symptoms worsen or fail to improve.    .     Janene Quintero MD   01/29/25      CC: Cough (cough x 3 days), Headache, and Nasal Congestion  This visit is conducted using telemedicine with live interactive video and audio. Karen Vasquez  verbally consents to virtual video visit.   Patient understands and accepts financial responsibility for any deductible and/or co-pays associated with the service.    HPI:   Karen Vasquez is a 50 year old female who presents for Cough (cough x 3 days), Headache, and Nasal Congestion     Cough since Saturday, sore throat, sneezing and nasal congestion, feels very congested, no fever, has had chills. Did not test for covid or flu. Did get her Flu vaccine in September  Mucus is lightly yellow colored, cough is productive. Not able to sleep at night due to cough.+ wheezing at times.  Denies shortness of  breath or chest pain.    ALLERGY:     Allergies as of 01/29/2025    (No Known Allergies)     MEDICATIONS:     Current Outpatient Medications   Medication Sig Dispense Refill    levothyroxine 25 MCG Oral Tab Take 1 tablet (25 mcg total) by mouth before breakfast. 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  Pertinent positives and negatives noted in the the HPI.      EXAM:   LMP 12/31/2024 (Exact Date)   GENERAL: obese,in no apparent distress  HEENT: atraumatic, normocephalic  NECK: supple  LUNGS: act of breathing is normal, coughing  CARDIO: speaking in full sentences without any distress    Please note that the following visit was completed using two-way, real-time interactive audio and video communication.  This has been done in good kyle to provide continuity of care . There are limitations of this visit, as no physical exam could be performed.  Visit was planned and structured to allow sufficient time to address the issues presented.  Billing for this visit takes into account review of history, labs, medications, radiology tests , consultations and/or decision making.  Appropriate  medical decision-making and tests are ordered as detailed in the assessment and plan of care.    NOTE TO PATIENT: The 21st Century Cures Act makes clinical notes like these available to patients in the interest of transparency. Clinical notes are medical documents used by physicians and care providers to communicate with each other. These documents include medical language and terminology, abbreviations, and treatment information that may sound technical and at times possibly unfamiliar. In addition, at times, the verbiage may appear blunt or direct. These documents are one tool providers use to communicate relevant information and clinical opinions of the care providers in a way that allows common understanding of the clinical context.      Janene Quintero MD

## 2025-01-29 NOTE — TELEPHONE ENCOUNTER
Action Requested: Summary for Provider     []  Critical Lab, Recommendations Needed  [x] Need Additional Advice  []   FYI    []   Need Orders  [] Need Medications Sent to Pharmacy  []  Other     SUMMARY: Patient reports a productive cough x 3 days, headache when coughing. Had fever on first day but that subsided. Patient taking Mucinex, tylenol and advil.     Patient is requesting antibiotics or appointment.First available is Monday. Recommended patient go to [facility] today for same day evaluation. She declines and would like to see if Dr Quintero can accommodate her for a video visit today. .     Reason for call: Cough  Onset: 3 days       Reason for Disposition   Patient wants to be seen    Protocols used: Cough-A-OH

## 2025-02-04 ENCOUNTER — OFFICE VISIT (OUTPATIENT)
Dept: FAMILY MEDICINE CLINIC | Facility: CLINIC | Age: 50
End: 2025-02-04
Payer: COMMERCIAL

## 2025-02-04 VITALS
HEIGHT: 60 IN | BODY MASS INDEX: 43.98 KG/M2 | TEMPERATURE: 98 F | RESPIRATION RATE: 18 BRPM | DIASTOLIC BLOOD PRESSURE: 78 MMHG | SYSTOLIC BLOOD PRESSURE: 130 MMHG | WEIGHT: 224 LBS | OXYGEN SATURATION: 98 % | HEART RATE: 80 BPM

## 2025-02-04 DIAGNOSIS — Z13.1 SCREENING FOR DIABETES MELLITUS: ICD-10-CM

## 2025-02-04 DIAGNOSIS — J20.9 ACUTE BRONCHITIS, UNSPECIFIED ORGANISM: Primary | ICD-10-CM

## 2025-02-04 DIAGNOSIS — Z00.00 LABORATORY EXAMINATION ORDERED AS PART OF A ROUTINE GENERAL MEDICAL EXAMINATION: ICD-10-CM

## 2025-02-04 PROCEDURE — 99213 OFFICE O/P EST LOW 20 MIN: CPT | Performed by: FAMILY MEDICINE

## 2025-02-04 PROCEDURE — 3008F BODY MASS INDEX DOCD: CPT | Performed by: FAMILY MEDICINE

## 2025-02-04 PROCEDURE — 3075F SYST BP GE 130 - 139MM HG: CPT | Performed by: FAMILY MEDICINE

## 2025-02-04 PROCEDURE — 3078F DIAST BP <80 MM HG: CPT | Performed by: FAMILY MEDICINE

## 2025-02-04 RX ORDER — PREDNISONE 20 MG/1
20 TABLET ORAL 2 TIMES DAILY
Qty: 10 TABLET | Refills: 0 | Status: SHIPPED | OUTPATIENT
Start: 2025-02-04 | End: 2025-02-09

## 2025-02-04 NOTE — PROGRESS NOTES
Family Medicine Progress Note  ASSESSMENT AND PLAN:  Karen Vasquez is a 50 year old female who is here for:     Karen was seen today for cough.    Diagnoses and all orders for this visit:    Acute bronchitis, unspecified organism  -     predniSONE 20 MG Oral Tab; Take 1 tablet (20 mg total) by mouth 2 (two) times daily for 5 days.  -    continue benzonatate and proair  -    push fluids  -    cool mist humidifier  -    follow up if symptoms worsen.    Laboratory examination ordered as part of a routine general medical examination  -     COMP METABOLIC PANEL [04795] [Q]  -     LIPID PANEL [7600] [Q]    Screening for diabetes mellitus  -     HGB A1C [496] [Q]        The patient indicates understanding of these issues and agrees to the plan.  Follow-Up: The patient is asked to return in Return if symptoms worsen or fail to improve.  .     Janene Quintero MD   02/04/25      CC: Cough    HPI:   Karen Vasquez is a 50 year old female who presents for Cough     Patient is seen for persistent cough, states headache and sinus pressure are better, cough is better but still there and will have episodes of dry cough, denies any SOB. Benzonatate and proair did help a little.  No fever or chest pain.    ALLERGY:     Allergies as of 02/04/2025    (No Known Allergies)     MEDICATIONS:     Current Outpatient Medications   Medication Sig Dispense Refill    benzonatate 200 MG Oral Cap Take 1 capsule (200 mg total) by mouth 3 (three) times daily as needed. 21 capsule 0    albuterol (PROAIR HFA) 108 (90 Base) MCG/ACT Inhalation Aero Soln Inhale 2 puffs into the lungs every 6 (six) hours as needed for Wheezing (cough). 8 g 0    levothyroxine 25 MCG Oral Tab Take 1 tablet (25 mcg total) by mouth before breakfast. 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   Tobacco Use    Smoking status: Never    Smokeless tobacco: Never   Vaping Use    Vaping status: Never Used   Substance and Sexual Activity    Alcohol use: No    Drug use: No    Sexual activity: Yes     Partners: Male     Comment: essure    Other Topics Concern    Caffeine Concern No    Exercise No    Seat Belt No    Special Diet No    Stress Concern No    Weight Concern Yes   Social History Narrative    Poor diet habits no exrcise     Social Drivers of Health     Food Insecurity: No Food Insecurity (2/4/2025)    NCSS - Food Insecurity     Worried About Running Out of Food in the Last Year: No     Ran Out of Food in the Last Year: No   Transportation Needs: No Transportation Needs (2/4/2025)    NCSS - Transportation     Lack of Transportation: No   Housing Stability: Not At Risk (2/4/2025)    NCSS - Housing/Utilities     Has Housing: Yes     Worried About Losing Housing: No     Unable to Get Utilities: No        REVIEW OF SYSTEMS:   A comprehensive 10 point review of systems was completed.  Pertinent positives and negatives noted in the the HPI.      EXAM:   /78   Pulse 80   Temp 98.2 °F (36.8 °C) (Temporal)   Resp 18   Ht 5' (1.524 m)   Wt 224 lb (101.6 kg)   LMP 12/31/2024 (Exact Date)   SpO2 98%   BMI 43.75 kg/m²   GENERAL: obese,in no apparent distress  HEENT: atraumatic, normocephalic,ears and throat are clear, mild tenderness over the sinuses  NECK: supple,no adenopathy  LUNGS: clear to auscultation, no wheezing, rhonchi or rales  CARDIO: RRR without murmur    NOTE TO PATIENT: The 21st Century Cures Act makes clinical notes like these available to patients in the interest of transparency. Clinical notes are medical documents used by physicians and care providers to communicate with each other. These documents include medical language and terminology, abbreviations, and treatment information that may sound technical and at times possibly unfamiliar.  In addition, at times, the verbiage may appear blunt or direct. These documents are one tool providers use to communicate relevant information and clinical opinions of the care providers in a way that allows common understanding of the clinical context.      Janene Quintero MD

## 2025-02-17 ENCOUNTER — TELEPHONE (OUTPATIENT)
Dept: FAMILY MEDICINE CLINIC | Facility: CLINIC | Age: 50
End: 2025-02-17

## 2025-02-17 NOTE — TELEPHONE ENCOUNTER
Levothyroxine 25 MC month supply sent to [facility] in [place] on  on 2025- sent [employer] message to patient to contact pharmacy

## 2025-02-17 NOTE — TELEPHONE ENCOUNTER
Patient called about medication levothyroxine 25 MCG Oral Tab  would like to have this filled by 12noon on  02/18/25 she is leaving for [employer] flight leaves at 7pm

## 2025-03-13 ENCOUNTER — NURSE TRIAGE (OUTPATIENT)
Dept: INTERNAL MEDICINE CLINIC | Facility: CLINIC | Age: 50
End: 2025-03-13

## 2025-03-13 ENCOUNTER — OFFICE VISIT (OUTPATIENT)
Dept: FAMILY MEDICINE CLINIC | Facility: CLINIC | Age: 50
End: 2025-03-13
Payer: COMMERCIAL

## 2025-03-13 VITALS
HEART RATE: 98 BPM | SYSTOLIC BLOOD PRESSURE: 104 MMHG | DIASTOLIC BLOOD PRESSURE: 72 MMHG | RESPIRATION RATE: 16 BRPM | BODY MASS INDEX: 44.17 KG/M2 | WEIGHT: 225 LBS | TEMPERATURE: 98 F | HEIGHT: 60 IN | OXYGEN SATURATION: 97 %

## 2025-03-13 DIAGNOSIS — J06.9 VIRAL URI WITH COUGH: Primary | ICD-10-CM

## 2025-03-13 PROCEDURE — 99213 OFFICE O/P EST LOW 20 MIN: CPT | Performed by: NURSE PRACTITIONER

## 2025-03-13 PROCEDURE — 3008F BODY MASS INDEX DOCD: CPT | Performed by: NURSE PRACTITIONER

## 2025-03-13 PROCEDURE — 3074F SYST BP LT 130 MM HG: CPT | Performed by: NURSE PRACTITIONER

## 2025-03-13 PROCEDURE — 3078F DIAST BP <80 MM HG: CPT | Performed by: NURSE PRACTITIONER

## 2025-03-13 NOTE — TELEPHONE ENCOUNTER
Action Requested: Summary for Provider     []  Critical Lab, Recommendations Needed  [] Need Additional Advice  []   FYI    []   Need Orders  [] Need Medications Sent to Pharmacy  []  Other     SUMMARY: Patient called to report a cough with green phlegm and headache. Symptoms started 4 days ago after returning from [place]. Unknown fever as she did not take temperature however states she feels cold. Difficulty sleeping due to cough. Advised based on symptoms to be seen and evaluated at a [facility].  Home care advice also given, push fluids, rest, tylenol/motrin, mucinex. Patient agrees with plan.         Reason for call: Cough  Onset: Data 4 days    Reason for Disposition   Patient wants to be seen    Protocols used: Cough-A-OH

## 2025-03-13 NOTE — PROGRESS NOTES
HPI:   Karen Vasquez is a 50 year old female who presents with ill symptoms for  4  days. Patient reports mild dry throat and cough while in [place], flew home two days ago. Has tried nothing but extra water intake with not much relief. Unknown if any on travels were sick.    Current Outpatient Medications   Medication Sig Dispense Refill    albuterol (PROAIR HFA) 108 (90 Base) MCG/ACT Inhalation Aero Soln Inhale 2 puffs into the lungs every 6 (six) hours as needed for Wheezing (cough). (Patient not taking: Reported on 3/13/2025) 8 g 0    levothyroxine 25 MCG Oral Tab Take 1 tablet (25 mcg total) by mouth before breakfast. (Patient not taking: Reported on 3/13/2025) 30 tablet 1     No current facility-administered medications for this visit.      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   Tobacco Use    Smoking status: Never    Smokeless tobacco: Never   Vaping Use    Vaping status: Never Used   Substance and Sexual Activity    Alcohol use: No    Drug use: No    Sexual activity: Yes     Partners: Male     Comment: essure    Other Topics Concern    Caffeine Concern No    Exercise No    Seat Belt No    Special Diet No    Stress Concern No    Weight Concern Yes   Social History Narrative    Poor diet habits no exrcise     Social Drivers of Health     Food Insecurity: No Food Insecurity (2025)    NCSS - Food Insecurity     Worried About Running Out of Food in the Last Year: No      Ran Out of Food in the Last Year: No   Transportation Needs: No Transportation Needs (2/4/2025)    NCSS - Transportation     Lack of Transportation: No   Housing Stability: Not At Risk (2/4/2025)    NCSS - Housing/Utilities     Has Housing: Yes     Worried About Losing Housing: No     Unable to Get Utilities: No         REVIEW OF SYSTEMS:   GENERAL: feels well otherwise, denies fatigue, notes occasional tactile fever  HEENT: congested, as above in HPI  LUNGS: denies shortness of breath with exertion  CARDIOVASCULAR: denies chest pain on exertion  GI: no nausea or abdominal pain, appetite normal  NEURO: denies headaches    EXAM:   /72   Pulse 98   Temp 97.6 °F (36.4 °C)   Resp 16   Ht 5' (1.524 m)   Wt 225 lb (102.1 kg)   LMP 02/14/2025 (Approximate)   SpO2 97%   BMI 43.94 kg/m²   GENERAL: well developed, well nourished,in no apparent distress  HEENT: atraumatic, normocephalic,ears full and clear, nares with positive erythema and congestion, throat pink with thin clear PND noted. [place]vuvla midline.  Mild maxillary and frontal sinus tenderness with palpation.  NECK: supple,no adenopathy  LUNGS: clear to auscultation all lobes, breathing is non labored  CARDIO: RRR without murmur      ASSESSMENT AND PLAN:    PLAN:Karen was seen today for cough.    Diagnoses and all orders for this visit:    Viral URI with cough    Comfort care as below. Self care discussed. Medication use and risk/benefit discussed. Patient is advised to follow up with PCP if not improving with treatment plan or seek immediate care if symptoms worsen.  The patient indicates understanding of these issues and agrees to the plan.  Patient Instructions   Saline nasal spray such as Ocean or Simply Saline to nostrils 2-3 times daily to soothe tissues and keep mucus thin. Then perform salt water gargles (1 tsp. Salt in 6 oz lukewarm water), use several times daily to remove post nasal drainage and soothe throat.  Hydrate! (cold or hot based  on comfort). Drink lots of water or other non dehydrating liquids to help with illness. Throat lozenges or other hard candy can soothe throat while awake.   Use humidifier in bedroom for congestion. Hot steamy showers can loosen congestion and help cough. Valdo's vapor rub to chest can quiet cough. Keep water by your bed side to sip on if you awaken with cough/sore throat.  Hand washing-use hand  or wash hands frequently, cover your cough or sneeze, do not share towels or drinks with others.  May use Tylenol or Ibuprofen for pain/comfort if not contraindicated.  No work or school until fever free for 24 hours and respiratory symptoms are mostly improved.  Follow up with primary care in two weeks if symptoms not completely resolved post walk in visit, or seek immediate care if symptoms significantly worsen.

## 2025-03-23 ENCOUNTER — TELEPHONE (OUTPATIENT)
Dept: FAMILY MEDICINE CLINIC | Facility: CLINIC | Age: 50
End: 2025-03-23

## 2025-03-23 DIAGNOSIS — J20.8 VIRAL BRONCHITIS: ICD-10-CM

## 2025-03-28 RX ORDER — ALBUTEROL SULFATE 90 UG/1
2 INHALANT RESPIRATORY (INHALATION) EVERY 6 HOURS PRN
Qty: 6.7 G | Refills: 0 | OUTPATIENT
Start: 2025-03-28

## 2025-03-28 NOTE — TELEPHONE ENCOUNTER
This was prescribed for acute symptoms, please check with patient if she is currently having symptoms.

## 2025-03-28 NOTE — TELEPHONE ENCOUNTER
Spoke to patient states that was a duplicate request. States frustration that when she called the office 3/13/25 with acute symptoms, was triaged and recommended she be seen at [facility] due to no office availability, [facility] diagnosed with sinus infection when she in fact had walking pneumonia, she was seen/diagnosed by pediatrician and was prescribed albuterol. She is feeling better still has wheezing at night however does not need albuterol refill from Dr. Quintero at this time.    Dr. Quintero - albuterol duplicate refill request. Expressed frustration by no availability when she was acutely sick and sent to [facility] and was misdiagnosed. Was diagnosed with sinus infection when in fact she had walking pneumonia

## 2025-04-08 ENCOUNTER — OFFICE VISIT (OUTPATIENT)
Dept: FAMILY MEDICINE CLINIC | Facility: CLINIC | Age: 50
End: 2025-04-08
Payer: COMMERCIAL

## 2025-04-08 VITALS
OXYGEN SATURATION: 98 % | TEMPERATURE: 98 F | DIASTOLIC BLOOD PRESSURE: 80 MMHG | SYSTOLIC BLOOD PRESSURE: 110 MMHG | WEIGHT: 226 LBS | BODY MASS INDEX: 44.37 KG/M2 | RESPIRATION RATE: 18 BRPM | HEIGHT: 60 IN | HEART RATE: 82 BPM

## 2025-04-08 DIAGNOSIS — M25.562 CHRONIC PAIN OF LEFT KNEE: ICD-10-CM

## 2025-04-08 DIAGNOSIS — Z13.1 SCREENING FOR DIABETES MELLITUS: ICD-10-CM

## 2025-04-08 DIAGNOSIS — M79.18 MYALGIA, LOWER LEG: ICD-10-CM

## 2025-04-08 DIAGNOSIS — R53.83 OTHER FATIGUE: ICD-10-CM

## 2025-04-08 DIAGNOSIS — Z12.31 VISIT FOR SCREENING MAMMOGRAM: ICD-10-CM

## 2025-04-08 DIAGNOSIS — E03.9 HYPOTHYROIDISM, UNSPECIFIED TYPE: ICD-10-CM

## 2025-04-08 DIAGNOSIS — Z00.00 ROUTINE GENERAL MEDICAL EXAMINATION AT A HEALTH CARE FACILITY: Primary | ICD-10-CM

## 2025-04-08 DIAGNOSIS — E66.813 CLASS 3 SEVERE OBESITY DUE TO EXCESS CALORIES WITHOUT SERIOUS COMORBIDITY WITH BODY MASS INDEX (BMI) OF 40.0 TO 44.9 IN ADULT (HCC): ICD-10-CM

## 2025-04-08 DIAGNOSIS — G89.29 CHRONIC PAIN OF LEFT KNEE: ICD-10-CM

## 2025-04-08 DIAGNOSIS — E66.01 CLASS 3 SEVERE OBESITY DUE TO EXCESS CALORIES WITHOUT SERIOUS COMORBIDITY WITH BODY MASS INDEX (BMI) OF 40.0 TO 44.9 IN ADULT (HCC): ICD-10-CM

## 2025-04-08 RX ORDER — LEVOTHYROXINE SODIUM 25 UG/1
25 TABLET ORAL
Qty: 60 TABLET | Refills: 0 | Status: SHIPPED | OUTPATIENT
Start: 2025-04-08

## 2025-04-08 NOTE — PROGRESS NOTES
Family Medicine Progress Note    Karen Vasquez is a 50 year old female.  ASSESSMENT AND PLAN:  Karen was seen today for physical and other.    Diagnoses and all orders for this visit:    Routine general medical examination at a health care facility  -     COMP METABOLIC PANEL [34583] [Q]; Future  -     LIPID PANEL [7600] [Q]; Future  -     CBC [6399] [Q]; Future    Visit for screening mammogram  -     3D Mammogram Digital Screen, Bilateral (CPT=77067/54096); Future    Other fatigue  -     Vitamin D, 25-Hydroxy; Future  -     FERRITIN [457] [Q]; Future  -     IRON AND TIBC [7573] [Q]; Future  -     VITAMIN B12 [927][Q]; Future    Screening for diabetes mellitus  -     HGB A1C [496] [Q]; Future    Hypothyroidism, unspecified type  Previously diagnosed with hypothyroidism and started on thyroid medication. Medication was discontinued for two months due to travel. Long-term treatment depends on the type of hypothyroidism.  - Prescribe two-month supply of levothyroxine  - Instruct to take levothyroxine for six weeks and recheck thyroid labs  - Reassess all labs after four weeks of medication use  -     TSH [899] [Q]; Future  -     T4 FREE [866] [Q]; Future  -     levothyroxine 25 MCG Oral Tab; Take 1 tablet (25 mcg total) by mouth before breakfast.    Myalgia, lower leg  Previously diagnosed with hypothyroidism and started on thyroid medication. Medication was discontinued for two months due to travel. Long-term treatment depends on the type of hypothyroidism.  - Prescribe two-month supply of levothyroxine  - Instruct to take levothyroxine for six weeks and recheck thyroid labs  - Reassess all labs after four weeks of medication use    Chronic pain of left knee  Knee pain and sensitivity, worsened by travel and weight. X-ray indicated joint space narrowing, suggesting potential arthritis. Advised weight management and exercise to mitigate symptoms.  - Encourage weight  management through diet and exercise  - Consider further evaluation if knee symptoms persist    Class 3 severe obesity due to excess calories without serious comorbidity with body mass index (BMI) of 40.0 to 44.9 in adult (HCC)  - encouraged healthy portion controlled diet  - limit refined sugars and carbs in diet  - increase protein to 15-30 gm with each meal.  - exercise for 150 min a week including cardio and strength training.    Age appropriate anticipatory guidance reviewed  Health Maintenance   Topic Date Due    Mammogram  10/03/2020    Annual Physical  01/09/2025    COVID-19 Vaccine (4 - 2024-25 season) 05/08/2025 (Originally 9/1/2024)    Pap Smear  12/27/2025    Zoster Vaccines (1 of 2) 01/19/2026    DTaP,Tdap,and Td Vaccines (2 - Td or Tdap) 01/12/2028    Colorectal Cancer Screening  05/24/2034    Influenza Vaccine  Completed    Annual Depression Screening  Completed    Pneumococcal Vaccine: Birth to 50yrs  Aged Out    Meningococcal B Vaccine  Aged Out       The patient indicates understanding of these issues and agrees to the plan.  Follow-Up: The patient is asked to Return in about 1 year (around 4/8/2026) for annual, Hypothyroidism f/u.      Janene Quintero MD        HPI:     History of Present Illness  Karen Vasquez is a 50 year old female who presents for annual physical.  Pap done in 2022 was normal, periods are regular and not heavy.  Does do breast self exam, mammogram due, no family history of breast ca.  Colonoscopy done last year was normal, repeat in 10 years.    After returning from a trip to [facility], she developed a severe cough and breathing difficulties. Initially, she experienced a dry cough while in [place], which worsened upon her return. She was initially misdiagnosed with a sinus condition at an urgent care clinic. Despite treatment, her symptoms persisted, leading her to consult a family friend who is a pediatrician. A chest x-ray revealed an infection in the middle of her lungs.  Treatment with albuterol improved her breathing and allowed her to sleep better. Although mostly recovered, she occasionally experiences a dry cough, particularly at night.    She has a history of hypothyroidism and was started on medication in October. However, she has been off her thyroid medication for almost two months due to not refilling her prescription before traveling to [facility]. She plans to restart her medication and recheck her labs after six weeks of consistent use.    She reports leg pain and swelling, particularly after her trip to [facility], where she engaged in extensive walking and traveling. Her feet were swollen, and she experienced cracked skin. Pain and swelling have resolved.She also mentions knee pain and sensitivity, with a prior x-ray in [facility] indicating some narrowing of joint space and potential arthritis.     She experiences occasional heartburn .    She reports poor sleep quality.    PAST MEDICAL HISTORY:     Past Medical History:    Obesity     PAST SURGICAL HISTORY:     Past Surgical History:   Procedure Laterality Date                Surgery - dmg Bilateral 2013    Essure for sterilization     ALLERGY:   Allergies[1]  MEDICATIONS:     Current Outpatient Medications   Medication Sig Dispense Refill    levothyroxine 25 MCG Oral Tab Take 1 tablet (25 mcg total) by mouth before breakfast. 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   Tobacco Use    Smoking status: Never    Smokeless tobacco: Never   Vaping Use    Vaping status: Never Used   Substance and Sexual Activity    Alcohol use: No    Drug use: No    Sexual activity: Yes     Partners: Male     Comment: essure    Other Topics Concern    Caffeine  Concern No    Exercise No    Seat Belt No    Special Diet No    Stress Concern No    Weight Concern Yes   Social History Narrative    Poor diet habits no exrcise     Social Drivers of Health     Food Insecurity: No Food Insecurity (2/4/2025)    NCSS - Food Insecurity     Worried About Running Out of Food in the Last Year: No     Ran Out of Food in the Last Year: No   Transportation Needs: No Transportation Needs (2/4/2025)    NCSS - Transportation     Lack of Transportation: No   Housing Stability: Not At Risk (2/4/2025)    NCSS - Housing/Utilities     Has Housing: Yes     Worried About Losing Housing: No     Unable to Get Utilities: No     REVIEW OF SYSTEMS:   Review of Systems   Constitutional:  Negative for appetite change, fatigue, fever and unexpected weight change.   HENT:  Negative for congestion, ear pain, hearing loss and sore throat.    Eyes:  Negative for discharge, redness and visual disturbance.   Respiratory:  Negative for cough, chest tightness and shortness of breath.    Cardiovascular:  Negative for chest pain and palpitations.   Gastrointestinal:  Negative for abdominal pain, blood in stool, constipation and nausea.   Endocrine: Negative for cold intolerance, heat intolerance and polyuria.   Genitourinary:  Negative for difficulty urinating, dysuria, frequency and urgency.   Musculoskeletal:  Negative for arthralgias, gait problem, joint swelling and myalgias.   Skin:  Negative for rash.   Allergic/Immunologic: Negative for food allergies.   Neurological:  Negative for dizziness, weakness, numbness and headaches.   Hematological:  Negative for adenopathy. Does not bruise/bleed easily.   Psychiatric/Behavioral:  Negative for dysphoric mood and sleep disturbance. The patient is not nervous/anxious.       PHYSICAL EXAM:   /80   Pulse 82   Temp 97.9 °F (36.6 °C) (Temporal)   Resp 18   Ht 5' (1.524 m)   Wt 226 lb (102.5 kg)   LMP 04/05/2025 (Approximate)   SpO2 98%   BMI 44.14 kg/m²      Physical Exam  Constitutional:       General: She is not in acute distress.     Appearance: Normal appearance. She is well-developed. She is obese.   HENT:      Head: Normocephalic and atraumatic.      Right Ear: Tympanic membrane, ear canal and external ear normal.      Left Ear: Tympanic membrane, ear canal and external ear normal.      Nose: Nose normal.      Mouth/Throat:      Mouth: Mucous membranes are moist.      Pharynx: Oropharynx is clear.   Eyes:      Extraocular Movements: Extraocular movements intact.      Conjunctiva/sclera: Conjunctivae normal.      Pupils: Pupils are equal, round, and reactive to light.   Neck:      Thyroid: No thyromegaly.   Cardiovascular:      Rate and Rhythm: Normal rate and regular rhythm.      Pulses: Normal pulses.      Heart sounds: Normal heart sounds. No murmur heard.  Pulmonary:      Effort: Pulmonary effort is normal. No respiratory distress.      Breath sounds: Normal breath sounds.   Chest:      Chest wall: No tenderness.   Abdominal:      General: Bowel sounds are normal. There is no distension.      Palpations: Abdomen is soft. There is no mass.      Tenderness: There is no abdominal tenderness.      Comments: Difficult to assess HSM due to patient BH.   Musculoskeletal:         General: Normal range of motion.      Cervical back: Normal range of motion and neck supple.      Right lower leg: No edema.      Left lower leg: No edema.   Lymphadenopathy:      Cervical: No cervical adenopathy.   Skin:     General: Skin is warm.      Findings: No rash.   Neurological:      General: No focal deficit present.      Mental Status: She is alert and oriented to person, place, and time.      Cranial Nerves: No cranial nerve deficit.      Sensory: No sensory deficit.      Motor: No weakness.      Coordination: Coordination normal.      Gait: Gait normal.      Deep Tendon Reflexes: Reflexes are normal and symmetric. Reflexes normal.   Psychiatric:         Mood and Affect: Mood  normal.         Behavior: Behavior normal.         Thought Content: Thought content normal.         Judgment: Judgment normal.           The 21st Century Cures Act makes medical notes like these available to patients in the interest of transparency. Please be advised this is a medical document. Medical documents are intended to carry relevant information, facts as evident, and the clinical opinion of the practitioner. The medical note is intended as peer to peer communication and may appear blunt or direct. It is written in medical language and may contain abbreviations or verbiage that are unfamiliar.     Janene Quintero MD             [1] No Known Allergies  [2]   Family History  Problem Relation Age of Onset    Other (Other) Mother         anemia    Other (Other) Maternal Grandmother         arthritis    Other (Other) Paternal Grandmother         arthritis

## 2025-06-18 ENCOUNTER — HOSPITAL ENCOUNTER (OUTPATIENT)
Dept: GENERAL RADIOLOGY | Age: 50
Discharge: HOME | End: 2025-06-18
Attending: FAMILY MEDICINE
Payer: COMMERCIAL

## 2025-06-18 ENCOUNTER — OFFICE VISIT (OUTPATIENT)
Dept: FAMILY MEDICINE CLINIC | Facility: CLINIC | Age: 50
End: 2025-06-18
Payer: COMMERCIAL

## 2025-06-18 VITALS
RESPIRATION RATE: 18 BRPM | BODY MASS INDEX: 45.94 KG/M2 | HEART RATE: 78 BPM | OXYGEN SATURATION: 98 % | SYSTOLIC BLOOD PRESSURE: 112 MMHG | WEIGHT: 234 LBS | TEMPERATURE: 98 F | DIASTOLIC BLOOD PRESSURE: 84 MMHG | HEIGHT: 60 IN

## 2025-06-18 DIAGNOSIS — G89.29 CHRONIC PAIN OF RIGHT KNEE: ICD-10-CM

## 2025-06-18 DIAGNOSIS — R53.83 OTHER FATIGUE: ICD-10-CM

## 2025-06-18 DIAGNOSIS — E55.9 VITAMIN D DEFICIENCY: ICD-10-CM

## 2025-06-18 DIAGNOSIS — E66.813 CLASS 3 SEVERE OBESITY DUE TO EXCESS CALORIES WITHOUT SERIOUS COMORBIDITY WITH BODY MASS INDEX (BMI) OF 45.0 TO 49.9 IN ADULT: ICD-10-CM

## 2025-06-18 DIAGNOSIS — M25.561 CHRONIC PAIN OF RIGHT KNEE: ICD-10-CM

## 2025-06-18 DIAGNOSIS — M79.10 MYALGIA: Primary | ICD-10-CM

## 2025-06-18 DIAGNOSIS — N95.1 PERIMENOPAUSAL: ICD-10-CM

## 2025-06-18 DIAGNOSIS — Z13.1 SCREENING FOR DIABETES MELLITUS: ICD-10-CM

## 2025-06-18 DIAGNOSIS — Z00.00 ROUTINE GENERAL MEDICAL EXAMINATION AT A HEALTH CARE FACILITY: ICD-10-CM

## 2025-06-18 DIAGNOSIS — E03.9 HYPOTHYROIDISM, UNSPECIFIED TYPE: ICD-10-CM

## 2025-06-18 PROCEDURE — 3008F BODY MASS INDEX DOCD: CPT | Performed by: FAMILY MEDICINE

## 2025-06-18 PROCEDURE — 3079F DIAST BP 80-89 MM HG: CPT | Performed by: FAMILY MEDICINE

## 2025-06-18 PROCEDURE — 73562 X-RAY EXAM OF KNEE 3: CPT | Performed by: FAMILY MEDICINE

## 2025-06-18 PROCEDURE — 99214 OFFICE O/P EST MOD 30 MIN: CPT | Performed by: FAMILY MEDICINE

## 2025-06-18 PROCEDURE — 3074F SYST BP LT 130 MM HG: CPT | Performed by: FAMILY MEDICINE

## 2025-06-18 RX ORDER — NAPROXEN 500 MG/1
500 TABLET ORAL 2 TIMES DAILY WITH MEALS
Qty: 30 TABLET | Refills: 0 | Status: SHIPPED | OUTPATIENT
Start: 2025-06-18

## 2025-06-18 NOTE — PROGRESS NOTES
Family Medicine Progress Note  ASSESSMENT AND PLAN:  Karen Vasquez is a 50 year old female who is here for:     Karen was seen today for body ache and/or chills, other, leg swelling and night sweats.    Diagnoses and all orders for this visit:    Myalgia  Severe generalized body pain, predominantly on the right side, with differential diagnosis including fibromyalgia, vitamin D deficiency, and perimenopausal symptoms.  - Order blood work including inflammatory markers and autoimmune panel.  - Encourage exercise and stretching.  - Prescribe naproxen for pain management.  -     SED RATE, WESTERGREN [809] [Q]; Future  -     C-REACTIVE PROTEIN [4420] [Q]; Future  -     CREATINE KINASE (CK) TOTAL [374] [Q]; Future    Chronic pain of right knee  Severe right knee pain with stiffness and difficulty straightening the leg, suspected arthritis.  - Order x-ray of the right knee.  - Consider referral to orthopedics if x-ray shows arthritis.  -     XR KNEE (3 VIEWS), RIGHT (CPT=73562); Future  -     naproxen 500 MG Oral Tab; Take 1 tablet (500 mg total) by mouth 2 (two) times daily with meals.    Hypothyroidism, unspecified type  -     T4 FREE [866] [Q]; Future  -     TSH [899] [Q]; Future    Routine general medical examination at a health care facility  -     CBC [6399] [Q]; Future  -     LIPID PANEL [7600] [Q]; Future  -     COMP METABOLIC PANEL [73117] [Q]; Future    Other fatigue  -     VITAMIN B12 [927][Q]; Future  -     IRON AND TIBC [7573] [Q]; Future  -     FERRITIN [457] [Q]; Future    Screening for diabetes mellitus  -     HGB A1C [496] [Q]; Future    Vitamin D deficiency  -     Vitamin D, 25-Hydroxy; Future    Perimenopausal  Irregular periods and amenorrhea, mood swings with symptoms consistent with perimenopausal syndrome.  - Discuss hormone replacement therapy if symptoms persist.    Class 3 severe obesity due to excess calories without serious comorbidity with body mass index (BMI) of 45.0 to 49.9 in  adult  Encouraged to limit calorie intake  - limit refined sugars and carbs  - increase protein to 15-30 gm with each meal  - exercise for 150 min a week including cardio and strength training       The patient indicates understanding of these issues and agrees to the plan.  Follow-Up: The patient is asked to return in Return in about 1 week (around 6/25/2025), or if symptoms worsen or fail to improve.      Janene Quintero MD        CC: Body ache and/or chills, Leg Swelling, and Night Sweats    The following individual(s) verbally consented to be recorded using ambient AI listening technology and understand that they can each withdraw their consent to this listening technology at any point by asking the clinician to turn off or pause the recording:    Patient name: Karen Vasquez    HPI:     History of Present Illness  Karen Vasquez is a 50 year old female who presents with widespread body pain and knee pain.    She experiences widespread body pain that has progressively worsened, particularly affecting her ability to sleep. The pain is described as a 'pinching sensation' and is present throughout her body, with specific mention of the right side, including the knee, lower back, and shoulder. The pain is more severe at night and impacts her ability to sit or get up due to her weight.    She has a history of vitamin D deficiency and has been taking 2000 IU of vitamin D daily. She also takes vitamin B12 and iron, but stopped these supplements three days ago, noticing a reduction in pain since then. She experiences extreme fatigue and has a busy schedule that affects her ability to wake up in the morning.    She reports irregular menstrual cycles for almost a year, with the last two months being amenorrheic. She experiences mood swings.    The right knee pain has been present for about two months, with the pain rated as a 9 out of 10. It worsens with activity and improves with rest. She notes swelling in the  knee and describes the pain as different from the rest of her body pain, with stiffness in the calf and thigh muscles. She has difficulty straightening her right leg due to the pain, which is located on the side of the knee.    She experiences neck and shoulder pain, which has been a long-standing issue. Physical therapy and chiropractic care have provided relief in the past. She also reports a 'pokey sensation' that is mostly present at night.    She maintains good hydration and reports drinking a lot of water. She has not taken any medication for the pain yet.      ALLERGY:     Allergies as of 06/18/2025    (No Known Allergies)       MEDICATIONS:     Current Medications[1]   Past Medical History[2]   Social History:  Short Social Hx on File[3]     REVIEW OF SYSTEMS:   A comprehensive 10 point review of systems was completed.  Pertinent positives and negatives noted in the the HPI.      EXAM:   /84   Pulse 78   Temp 97.8 °F (36.6 °C) (Temporal)   Resp 18   Ht 5' (1.524 m)   Wt 234 lb (106.1 kg)   LMP 04/05/2025 (Approximate)   SpO2 98%   BMI 45.70 kg/m²   GENERAL: obese,in no apparent distress  NECK: supple,no adenopathy,no thyromegaly  LUNGS: clear to auscultation  CARDIO: RRR without murmur  MUSCULOSKELETAL: 16 positive tender points  KNEE: right, no swelling, + tenderness to palpation medial joint line, ROM is painful  EXTREMITIES: trace edema      NOTE TO PATIENT: The 21st Century Cures Act makes clinical notes like these available to patients in the interest of transparency. Clinical notes are medical documents used by physicians and care providers to communicate with each other. These documents include medical language and terminology, abbreviations, and treatment information that may sound technical and at times possibly unfamiliar. In addition, at times, the verbiage may appear blunt or direct. These documents are one tool providers use to communicate relevant information and clinical opinions  of the care providers in a way that allows common understanding of the clinical context.      Janene Quintero MD           [1]   Current Outpatient Medications   Medication Sig Dispense Refill    naproxen 500 MG Oral Tab Take 1 tablet (500 mg total) by mouth 2 (two) times daily with meals. 30 tablet 0    levothyroxine 25 MCG Oral Tab Take 1 tablet (25 mcg total) by mouth before breakfast. 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   Tobacco Use    Smoking status: Never    Smokeless tobacco: Never   Vaping Use    Vaping status: Never Used   Substance and Sexual Activity    Alcohol use: No    Drug use: No    Sexual activity: Yes     Partners: Male     Comment: essure    Other Topics Concern    Caffeine Concern No    Exercise No    Seat Belt No    Special Diet No    Stress Concern No    Weight Concern Yes   Social History Narrative    Poor diet habits no exrcise     Social Drivers of Health     Food Insecurity: No Food Insecurity (2/4/2025)    NCSS - Food Insecurity     Worried About Running Out of Food in the Last Year: No     Ran Out of Food in the Last Year: No   Transportation Needs: No Transportation Needs (2/4/2025)    NCSS - Transportation     Lack of Transportation: No   Housing Stability: Not At Risk (2/4/2025)    NCSS - Housing/Utilities     Has Housing: Yes     Worried About Losing Housing: No     Unable to Get Utilities: No

## 2025-06-18 NOTE — PATIENT INSTRUCTIONS
VISIT SUMMARY:    Today, you were seen for widespread body pain and knee pain. You reported that the pain has been worsening, especially at night, and it affects your sleep and daily activities. You also mentioned irregular menstrual cycles and mood swings. We discussed your history of vitamin D deficiency and recent changes in your supplement intake. Additionally, you have been experiencing severe right knee pain, which has been affecting your mobility.    YOUR PLAN:    -GENERALIZED MYALGIA: Generalized myalgia means widespread muscle pain. We will conduct blood tests to check for inflammation and autoimmune conditions. You are encouraged to exercise and stretch regularly. For pain relief, you are prescribed naproxen.    -RIGHT KNEE PAIN: Your right knee pain is suspected to be arthritis. We will get an x-ray of your right knee to confirm this. If arthritis is found, you may be referred to an orthopedic specialist.    -PERIMENOPAUSE: Perimenopause is the transition period before menopause, often marked by irregular periods and other symptoms. If your symptoms continue, we can discuss hormone replacement therapy.    WEIGHT: difficulty with mobility can be due to excess weight, encourage dietary changes and regular exercise.    -GENERAL HEALTH MAINTENANCE: Routine blood work, including thyroid function tests, is still needed. Please complete these tests at [facility] as soon as possible.    INSTRUCTIONS:    Please follow up with the blood work and x-ray as discussed. If your symptoms persist or worsen, schedule a follow-up appointment. Continue taking your vitamin D supplement and consider resuming vitamin B12 and iron if advised.

## 2025-06-20 ENCOUNTER — PATIENT MESSAGE (OUTPATIENT)
Dept: FAMILY MEDICINE CLINIC | Facility: CLINIC | Age: 50
End: 2025-06-20

## 2025-06-20 LAB
ALBUMIN/GLOBULIN RATIO: 1.5 (CALC) (ref 1–2.5)
ALBUMIN: 4.1 G/DL (ref 3.6–5.1)
ALKALINE PHOSPHATASE: 52 U/L (ref 31–125)
ALT: 25 U/L (ref 6–29)
AST: 21 U/L (ref 10–35)
BILIRUBIN, TOTAL: 0.8 MG/DL (ref 0.2–1.2)
BUN: 7 MG/DL (ref 7–25)
CALCIUM: 9.1 MG/DL (ref 8.6–10.2)
CARBON DIOXIDE: 26 MMOL/L (ref 20–32)
CHLORIDE: 104 MMOL/L (ref 98–110)
CHOL/HDLC RATIO: 3.3 (CALC)
CHOLESTEROL, TOTAL: 196 MG/DL
CREATININE: 0.56 MG/DL (ref 0.5–0.99)
EGFR: 112 ML/MIN/1.73M2
GLOBULIN: 2.7 G/DL (CALC) (ref 1.9–3.7)
GLUCOSE: 109 MG/DL (ref 65–99)
HDL CHOLESTEROL: 59 MG/DL
HEMOGLOBIN A1C: 6.3 %
LDL-CHOLESTEROL: 111 MG/DL (CALC)
NON-HDL CHOLESTEROL: 137 MG/DL (CALC)
POTASSIUM: 4.3 MMOL/L (ref 3.5–5.3)
PROTEIN, TOTAL: 6.8 G/DL (ref 6.1–8.1)
SODIUM: 138 MMOL/L (ref 135–146)
TRIGLYCERIDES: 148 MG/DL

## 2025-06-24 LAB
% SATURATION: 27 % (CALC) (ref 16–45)
ABSOLUTE BASOPHILS: 59 CELLS/UL (ref 0–200)
ABSOLUTE EOSINOPHILS: 142 CELLS/UL (ref 15–500)
ABSOLUTE LYMPHOCYTES: 1888 CELLS/UL (ref 850–3900)
ABSOLUTE MONOCYTES: 608 CELLS/UL (ref 200–950)
ABSOLUTE NEUTROPHILS: 3204 CELLS/UL (ref 1500–7800)
ALBUMIN/GLOBULIN RATIO: 1.4 (CALC) (ref 1–2.5)
ALBUMIN: 3.9 G/DL (ref 3.6–5.1)
ALKALINE PHOSPHATASE: 54 U/L (ref 31–125)
ALT: 20 U/L (ref 6–29)
AST: 17 U/L (ref 10–35)
BASOPHILS: 1 %
BILIRUBIN, TOTAL: 0.5 MG/DL (ref 0.2–1.2)
BUN: 8 MG/DL (ref 7–25)
C-REACTIVE PROTEIN: 3.9 MG/L
CALCIUM: 8.6 MG/DL (ref 8.6–10.2)
CARBON DIOXIDE: 22 MMOL/L (ref 20–32)
CHLORIDE: 107 MMOL/L (ref 98–110)
CHOL/HDLC RATIO: 3.2 (CALC)
CHOLESTEROL, TOTAL: 181 MG/DL
CREATINE KINASE, TOTAL: 72 U/L (ref 20–239)
CREATININE: 0.63 MG/DL (ref 0.5–0.99)
EGFR: 109 ML/MIN/1.73M2
EOSINOPHILS: 2.4 %
FERRITIN: 48 NG/ML (ref 16–232)
GLOBULIN: 2.7 G/DL (CALC) (ref 1.9–3.7)
GLUCOSE: 112 MG/DL (ref 65–99)
HDL CHOLESTEROL: 56 MG/DL
HEMATOCRIT: 40.5 % (ref 35–45)
HEMOGLOBIN A1C: 6.2 %
HEMOGLOBIN: 12.9 G/DL (ref 11.7–15.5)
IRON BINDING CAPACITY: 331 MCG/DL (CALC) (ref 250–450)
IRON, TOTAL: 89 MCG/DL (ref 40–190)
LDL-CHOLESTEROL: 104 MG/DL (CALC)
LYMPHOCYTES: 32 %
MCH: 30.4 PG (ref 27–33)
MCHC: 31.9 G/DL (ref 32–36)
MCV: 95.5 FL (ref 80–100)
MONOCYTES: 10.3 %
MPV: 10.5 FL (ref 7.5–12.5)
NEUTROPHILS: 54.3 %
NON-HDL CHOLESTEROL: 125 MG/DL (CALC)
PLATELET COUNT: 222 THOUSAND/UL (ref 140–400)
POTASSIUM: 4.3 MMOL/L (ref 3.5–5.3)
PROTEIN, TOTAL: 6.6 G/DL (ref 6.1–8.1)
RDW: 12.2 % (ref 11–15)
RED BLOOD CELL COUNT: 4.24 MILLION/UL (ref 3.8–5.1)
SED RATE BY MODIFIED$WESTERGREN: 6 MM/H
SODIUM: 138 MMOL/L (ref 135–146)
T4, FREE: 1 NG/DL (ref 0.8–1.8)
TRIGLYCERIDES: 118 MG/DL
TSH: 4.25 MIU/L
VITAMIN B12: 740 PG/ML (ref 200–1100)
VITAMIN D, 25-OH, TOTAL: 35 NG/ML (ref 30–100)
WHITE BLOOD CELL COUNT: 5.9 THOUSAND/UL (ref 3.8–10.8)

## 2025-07-01 ENCOUNTER — PATIENT MESSAGE (OUTPATIENT)
Dept: FAMILY MEDICINE CLINIC | Facility: CLINIC | Age: 50
End: 2025-07-01

## 2025-07-03 DIAGNOSIS — E03.9 HYPOTHYROIDISM, UNSPECIFIED TYPE: ICD-10-CM

## 2025-07-03 RX ORDER — LEVOTHYROXINE SODIUM 25 UG/1
25 TABLET ORAL
Qty: 90 TABLET | Refills: 3 | Status: SHIPPED | OUTPATIENT
Start: 2025-07-03

## 2025-07-16 NOTE — TELEPHONE ENCOUNTER
Noted  levothyroxine 25 MCG Oral Tab 90 tablet 3 7/3/2025 --    Sig - Route: Take 1 tablet (25 mcg total) by mouth before breakfast. - Oral    Sent to pharmacy as: Levothyroxine Sodium 25 MCG Oral Tablet (Synthroid)    E-Prescribing Status: Receipt confirmed by pharmacy (7/3/2025  3:58 PM CDT)

## (undated) NOTE — LETTER
06/19/20        Karen Vasquez  [address] [place] [address]  [place] [id number]      Dear Leti Fuentes records indicate that you have outstanding lab work and or testing that was ordered for you and has not yet been completed:  Orders Placed This [facility]

## (undated) NOTE — LETTER
10/05/20        Karen Vasquez  [address] [place] [address]  [facility] [id number]      Dear Fabrizio Headings records indicate that you have outstanding lab work and or testing that was ordered for you and has not yet been completed:  Orders Placed This [facility]

## (undated) NOTE — LETTER
06/04/19        Karen Vasquez  [address] [place] [address] L[address]  [place] [phone]      Dear Jose Luis Kidney records indicate that you have outstanding lab work and or testing that was ordered for you and has not yet been completed:  Orders Placed This [facility]